# Patient Record
Sex: FEMALE | Race: BLACK OR AFRICAN AMERICAN | Employment: FULL TIME | URBAN - METROPOLITAN AREA
[De-identification: names, ages, dates, MRNs, and addresses within clinical notes are randomized per-mention and may not be internally consistent; named-entity substitution may affect disease eponyms.]

---

## 2017-08-09 ENCOUNTER — OFFICE VISIT (OUTPATIENT)
Dept: INTERNAL MEDICINE CLINIC | Age: 37
End: 2017-08-09

## 2017-08-09 VITALS
HEART RATE: 67 BPM | RESPIRATION RATE: 16 BRPM | HEIGHT: 64 IN | OXYGEN SATURATION: 98 % | SYSTOLIC BLOOD PRESSURE: 113 MMHG | DIASTOLIC BLOOD PRESSURE: 66 MMHG | TEMPERATURE: 98.5 F | BODY MASS INDEX: 27.31 KG/M2 | WEIGHT: 160 LBS

## 2017-08-09 DIAGNOSIS — Z30.09 BIRTH CONTROL COUNSELING: ICD-10-CM

## 2017-08-09 DIAGNOSIS — Z00.00 WELLNESS EXAMINATION: Primary | ICD-10-CM

## 2017-08-09 DIAGNOSIS — J30.9 ALLERGIC RHINITIS, UNSPECIFIED ALLERGIC RHINITIS TRIGGER, UNSPECIFIED RHINITIS SEASONALITY: ICD-10-CM

## 2017-08-09 DIAGNOSIS — N93.8 DUB (DYSFUNCTIONAL UTERINE BLEEDING): ICD-10-CM

## 2017-08-09 LAB
HCG URINE, QL. (POC): NEGATIVE
VALID INTERNAL CONTROL?: YES

## 2017-08-09 RX ORDER — MONTELUKAST SODIUM 10 MG/1
10 TABLET ORAL DAILY
COMMUNITY
End: 2017-08-09 | Stop reason: SDUPTHER

## 2017-08-09 RX ORDER — ACETAMINOPHEN AND CODEINE PHOSPHATE 120; 12 MG/5ML; MG/5ML
0.35 SOLUTION ORAL DAILY
Qty: 84 TAB | Refills: 3 | Status: SHIPPED | OUTPATIENT
Start: 2017-08-09 | End: 2018-08-24 | Stop reason: ALTCHOICE

## 2017-08-09 RX ORDER — MONTELUKAST SODIUM 10 MG/1
10 TABLET ORAL DAILY
Qty: 90 TAB | Refills: 3 | Status: SHIPPED | OUTPATIENT
Start: 2017-08-09 | End: 2018-06-03 | Stop reason: SDUPTHER

## 2017-08-09 RX ORDER — ALBUTEROL SULFATE 90 UG/1
AEROSOL, METERED RESPIRATORY (INHALATION)
COMMUNITY
End: 2020-11-23 | Stop reason: SDUPTHER

## 2017-08-09 NOTE — PROGRESS NOTES
Ronal Ortega is a 29 y.o. female who presents today for Establish Care (Pt here to re-establish care) and Medication Refill  . She has a history of   Patient Active Problem List   Diagnosis Code    Hx of ectopic pregnancy Z87.59    Encounter for repeat Papanicolaou smear of cervix due to previous unsatisfactory results R87.615   . Today patient is here to re-establish. she does not have other concerns. Allergies: taking albuterol PRN. On antihistamine and singulair. Had a baby last year. Health maintenance hx includes:  Exercise: moderately active. Form of exercise: Starting working out. 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  and child, and 2 step children. Family: MGM with colon cancer. Father: healthy. Mother: Healthy, some arthritis. MGF: liver disease    Screening:    Breast cancer screening: last mammogram 2015 and   was normal   Cervical cancer screening: last PAP/Pelvic exam: 2016  and was normal. OBGYN: Midwives. Still nursing. Irregular. On Micronor    Immunizations:     Immunization History   Administered Date(s) Administered    Hepatitis B Vaccine 11/25/2011, 12/27/2011, 05/30/2012    Tdap 08/07/2014, 01/18/2016      Immunization status: up to date and documented. ROS  Review of Systems   Constitutional: Negative for chills, fever and weight loss. HENT: Negative for congestion and sore throat. Eyes: Negative for blurred vision, double vision, photophobia and pain. Respiratory: Negative for cough and shortness of breath. Cardiovascular: Negative for chest pain, palpitations, orthopnea, claudication and leg swelling. Gastrointestinal: Negative for abdominal pain, constipation, diarrhea, heartburn, nausea and vomiting. Genitourinary: Negative for dysuria, frequency and urgency. Musculoskeletal: Negative for joint pain and myalgias. Skin: Negative for rash. Neurological: Negative. Negative for headaches. Endo/Heme/Allergies: Does not bruise/bleed easily. Psychiatric/Behavioral: Negative for depression, memory loss, substance abuse and suicidal ideas. Visit Vitals    /66 (BP 1 Location: Left arm, BP Patient Position: Sitting)    Pulse 67    Temp 98.5 °F (36.9 °C) (Oral)    Resp 16    Ht 5' 4\" (1.626 m)    Wt 160 lb (72.6 kg)    SpO2 98%    Breastfeeding Yes    BMI 27.46 kg/m2       Physical Exam   Constitutional: She is oriented to person, place, and time. She appears well-developed and well-nourished. No distress. HENT:   Head: Normocephalic and atraumatic. Neck: Normal range of motion. Neck supple. No thyromegaly present. Cardiovascular: Normal rate and regular rhythm. No murmur heard. Pulmonary/Chest: Effort normal and breath sounds normal. She has no wheezes. Abdominal: Soft. Bowel sounds are normal. She exhibits no distension. Musculoskeletal: She exhibits no edema. Neurological: She is alert and oriented to person, place, and time. No cranial nerve deficit. Skin: Skin is warm and dry. Psychiatric: She has a normal mood and affect. Her behavior is normal.         Current Outpatient Prescriptions   Medication Sig    albuterol (PROAIR HFA) 90 mcg/actuation inhaler Take  by inhalation.  norethindrone (MICRONOR) 0.35 mg tab Take 1 Tab by mouth daily. Indications: Pregnancy Contraception    montelukast (SINGULAIR) 10 mg tablet Take 1 Tab by mouth daily.  PNV#67-iron ps-FA cmb#1-dha (VITAFOL ULTRA) 29 mg iron- 1 mg-200 mg cap Take 1 Tab by mouth daily.  Cetirizine 10 mg cap Take  by mouth. No current facility-administered medications for this visit.          Past Medical History:   Diagnosis Date    Abnormal Papanicolaou smear of cervix 2012    colpo with biopsy, expectant management last pap 2015 normal    Asthma     Ectopic pregnancy       Past Surgical History:   Procedure Laterality Date    HX GYN      ectopic pregnancy with fallopian tube removed      Social History   Substance Use Topics    Smoking status: Never Smoker    Smokeless tobacco: Never Used    Alcohol use 1.0 oz/week     1 Glasses of wine, 1 Shots of liquor per week      Comment: occasionally      Family History   Problem Relation Age of Onset    Colon Polyps Father     Cancer Maternal Aunt      breast    Cancer Paternal Grandmother      colon CA    Arthritis-osteo Mother     Other Mother      palpitations    Alcohol abuse Paternal Grandfather     Heart Disease Paternal Grandfather     Alcohol abuse Maternal Grandfather     Asthma Neg Hx     Diabetes Neg Hx         Allergies   Allergen Reactions    Latex Rash        Assessment/Plan  Diagnoses and all orders for this visit:    1. Wellness examination - re-establishing care. Healthy. No concerns. Check fasting bloodwork. Odalys Callahan was counseled on age-appropriate/ guideline-based risk prevention behaviors and screening for a 29y.o. year old   female . We also discussed adjustments in screening based on family history if necessary. Printed instructions for preventative screening guidelines and healthy behaviors given to patient with after visit summary.    -     CBC WITH AUTOMATED DIFF  -     METABOLIC PANEL, COMPREHENSIVE  -     LIPID PANEL    2. Birth control counseling  -     AMB POC URINE PREGNANCY TEST, VISUAL COLOR COMPARISON  -     norethindrone (MICRONOR) 0.35 mg tab; Take 1 Tab by mouth daily. Indications: Pregnancy Contraception    3. DUB (dysfunctional uterine bleeding) - No smoking or family history of clots. -     norethindrone (MICRONOR) 0.35 mg tab; Take 1 Tab by mouth daily. Indications: Pregnancy Contraception    4. Allergic rhinitis, unspecified allergic rhinitis trigger, unspecified rhinitis seasonality  -     montelukast (SINGULAIR) 10 mg tablet; Take 1 Tab by mouth daily.         Follow-up Disposition:  Return in about 1 year (around 8/9/2018).     Emma Mcnair MD  8/9/2017

## 2017-08-09 NOTE — PATIENT INSTRUCTIONS

## 2017-08-09 NOTE — MR AVS SNAPSHOT
Visit Information Date & Time Provider Department Dept. Phone Encounter #  
 8/9/2017 10:15 AM Criss Lobato MD [facility] [address] [place] [id number] Follow-up Instructions Return in about 1 year (around 8/9/2018). Upcoming Health Maintenance Date Due INFLUENZA AGE 9 TO ADULT 8/1/2017 PAP AKA CERVICAL CYTOLOGY 11/11/2019 DTaP/Tdap/Td series (2 - Td) 1/18/2026 Allergies as of 8/9/2017  Review Complete On: 8/9/2017 By: Criss Lobato MD  
  
 Severity Noted Reaction Type Reactions Latex  06/26/2015    Rash Current Immunizations  Reviewed on 8/7/2014 Name Date Hepatitis B Vaccine 5/30/2012, 12/27/2011, 11/25/2011 Tdap 1/18/2016, 8/7/2014 Not reviewed this visit You Were Diagnosed With   
  
 Codes Comments Wellness examination    -  Primary ICD-10-CM: Z00.00 ICD-9-CM: V70.0 Birth control counseling     ICD-10-CM: Z30.09 
ICD-9-CM: V25.09   
 DUB (dysfunctional uterine bleeding)     ICD-10-CM: N93.8 ICD-9-CM: 626.8 Allergic rhinitis, unspecified allergic rhinitis trigger, unspecified rhinitis seasonality     ICD-10-CM: J30.9 ICD-9-CM: 477.9 Vitals BP Pulse Temp Resp Height(growth percentile) Weight(growth percentile) 113/66 (BP 1 Location: Left arm, BP Patient Position: Sitting) 67 98.5 °F (36.9 °C) (Oral) 16 5' 4\" (1.626 m) 160 lb (72.6 kg) SpO2 Breastfeeding? BMI OB Status Smoking Status 98% Yes 27.46 kg/m2 Breastfeeding Never Smoker Vitals History BMI and BSA Data Body Mass Index Body Surface Area  
 27.46 kg/m 2 1.81 m 2 Preferred Pharmacy Pharmacy Name Phone [phone] [phone] Your Updated Medication List  
  
   
This list is accurate as of: 8/9/17 11:07 AM.  Always use your most recent med list.  
  
  
  
  
 Cetirizine 10 mg Cap Take  by mouth. montelukast 10 mg tablet Commonly known as:  SINGULAIR Take 1 Tab by mouth daily. norethindrone 0.35 mg Tab Commonly known as:  Jordan [employer] Jordan Take 1 Tab by mouth daily. Indications: Pregnancy Contraception PNV 67-iron ps-folate no. 1-dha 29 mg iron- 1 mg-200 mg Cap Commonly known as:  [place] Ame Take 1 Tab by mouth daily. PROAIR HFA 90 mcg/actuation inhaler Generic drug:  albuterol Take  by inhalation. Prescriptions Sent to Pharmacy Refills  
 norethindrone (MICRONOR) 0.35 mg tab 3 Sig: Take 1 Tab by mouth daily. Indications: Pregnancy Contraception Class: Normal  
 Pharmacy: [facility] [place] [address] Ph #: [phone] Route: Oral  
 montelukast (SINGULAIR) 10 mg tablet 3 Sig: Take 1 Tab by mouth daily. Class: Normal  
 Pharmacy: [facility]/pharmacy #[address] [place] [address] Ph #: [phone] Route: Oral  
  
We Performed the Following AMB POC URINE PREGNANCY TEST, VISUAL COLOR COMPARISON [84042 CPT(R)] CBC WITH AUTOMATED DIFF [62844 CPT(R)] LIPID PANEL [25922 CPT(R)] METABOLIC PANEL, COMPREHENSIVE [66352 CPT(R)] Follow-up Instructions Return in about 1 year (around 8/9/2018). Patient Instructions Well Visit, Ages 25 to 48: Care Instructions Your Care Instructions Physical exams can help you stay healthy. Your doctor has checked your overall health and may have suggested ways to take good care of yourself. He or she also may have recommended tests. At home, you can help prevent illness with healthy eating, regular exercise, and other steps. Follow-up care is a key part of your treatment and safety. Be sure to make and go to all appointments, and call your doctor if you are having problems. It's also a good idea to know your test results and keep a list of the medicines you take. How can you care for yourself at home? · Reach and stay at a healthy weight. This will lower your risk for many problems, such as obesity, diabetes, heart disease, and high blood pressure. · Get at least 30 minutes of physical activity on most days of the week. Walking is a good choice. You also may want to do other activities, such as running, swimming, cycling, or playing tennis or team sports. Discuss any changes in your exercise program with your doctor. · Do not smoke or allow others to smoke around you. If you need help quitting, talk to your doctor about stop-smoking programs and medicines. These can increase your chances of quitting for good. · Talk to your doctor about whether you have any risk factors for sexually transmitted infections (STIs). Having one sex partner (who does not have STIs and does not have sex with anyone else) is a good way to avoid these infections. · Use birth control if you do not want to have children at this time. Talk with your doctor about the choices available and what might be best for you. · Protect your skin from too much sun. When you're outdoors from 10 a.m. to 4 p.m., stay in the shade or cover up with clothing and a hat with a wide brim. Wear sunglasses that block UV rays. Even when it's cloudy, put broad-spectrum sunscreen (SPF 30 or higher) on any exposed skin. · See a dentist one or two times a year for checkups and to have your teeth cleaned. · Wear a seat belt in the car. · Drink alcohol in moderation, if at all. That means no more than 2 drinks a day for men and 1 drink a day for women. Follow your doctor's advice about when to have certain tests. These tests can spot problems early. For everyone · Cholesterol. Have the fat (cholesterol) in your blood tested after age 21. Your doctor will tell you how often to have this done based on your age, family history, or other things that can increase your risk for heart disease. · Blood pressure. Have your blood pressure checked during a routine doctor visit. Your doctor will tell you how often to check your blood pressure based on your age, your blood pressure results, and other factors. · Vision. Talk with your doctor about how often to have a glaucoma test. 
· Diabetes. Ask your doctor whether you should have tests for diabetes. · Colon cancer. Have a test for colon cancer at age 48. You may have one of several tests. If you are younger than 48, you may need a test earlier if you have any risk factors. Risk factors include whether you already had a precancerous polyp removed from your colon or whether your parent, brother, sister, or child has had colon cancer. For women · Breast exam and mammogram. Talk to your doctor about when you should have a clinical breast exam and a mammogram. Medical experts differ on whether and how often women under 50 should have these tests. Your doctor can help you decide what is right for you. · Pap test and pelvic exam. Begin Pap tests at age 24. A Pap test is the best way to find cervical cancer. The test often is part of a pelvic exam. Ask how often to have this test. 
· Tests for sexually transmitted infections (STIs). Ask whether you should have tests for STIs. You may be at risk if you have sex with more than one person, especially if your partners do not wear condoms. For men · Tests for sexually transmitted infections (STIs). Ask whether you should have tests for STIs. You may be at risk if you have sex with more than one person, especially if you do not wear a condom. · Testicular cancer exam. Ask your doctor whether you should check your testicles regularly. · Prostate exam. Talk to your doctor about whether you should have a blood test (called a PSA test) for prostate cancer.  Experts differ on whether and when men should have this test. Some experts suggest it if you are older than 39 and are -American or have a father or brother who got prostate cancer when he was younger than 72. When should you call for help? Watch closely for changes in your health, and be sure to contact your doctor if you have any problems or symptoms that concern you. Where can you learn more? Go to http://kody-kimberlee.info/. Enter P072 in the search box to learn more about \"Well Visit, Ages 25 to 48: Care Instructions. \" Current as of: July 19, 2016 Content Version: 11.3 © 3955-5553 [email or web address]. Care instructions adapted under license by [employer] (which disclaims liability or warranty for this information). If you have questions about a medical condition or this instruction, always ask your healthcare professional. [address] any warranty or liability for your use of this information. Introducing [facility]! Dear Vesta Rome: Thank you for requesting a [employer] account. Our records indicate that you already have an active [employer] account. You can access your account anytime at [email or web address] Did you know that you can access your hospital and ER discharge instructions at any time in [employer]? You can also review all of your test results from your hospital stay or ER visit. Additional Information If you have questions, please visit the Frequently Asked Questions section of the [employer] website at [email or web address] Remember, [employer] is NOT to be used for urgent needs. For medical emergencies, dial 911. Now available from your iPhone and Android! Please provide this summary of care documentation to your next provider. Your primary care clinician is listed as Talib Simms. If you have any questions after today's visit, please call [phone].

## 2017-08-10 LAB
ALBUMIN SERPL-MCNC: 4.3 G/DL (ref 3.5–5.5)
ALBUMIN/GLOB SERPL: 1.4 {RATIO} (ref 1.2–2.2)
ALP SERPL-CCNC: 103 IU/L (ref 39–117)
ALT SERPL-CCNC: 8 IU/L (ref 0–32)
AST SERPL-CCNC: 15 IU/L (ref 0–40)
BASOPHILS # BLD AUTO: 0 X10E3/UL (ref 0–0.2)
BASOPHILS NFR BLD AUTO: 0 %
BILIRUB SERPL-MCNC: 0.7 MG/DL (ref 0–1.2)
BUN SERPL-MCNC: 11 MG/DL (ref 6–20)
BUN/CREAT SERPL: 17 (ref 9–23)
CALCIUM SERPL-MCNC: 9.4 MG/DL (ref 8.7–10.2)
CHLORIDE SERPL-SCNC: 100 MMOL/L (ref 96–106)
CHOLEST SERPL-MCNC: 164 MG/DL (ref 100–199)
CO2 SERPL-SCNC: 25 MMOL/L (ref 18–29)
CREAT SERPL-MCNC: 0.66 MG/DL (ref 0.57–1)
EOSINOPHIL # BLD AUTO: 0.2 X10E3/UL (ref 0–0.4)
EOSINOPHIL NFR BLD AUTO: 4 %
ERYTHROCYTE [DISTWIDTH] IN BLOOD BY AUTOMATED COUNT: 12.9 % (ref 12.3–15.4)
GLOBULIN SER CALC-MCNC: 3.1 G/DL (ref 1.5–4.5)
GLUCOSE SERPL-MCNC: 81 MG/DL (ref 65–99)
HCT VFR BLD AUTO: 38.4 % (ref 34–46.6)
HDLC SERPL-MCNC: 57 MG/DL
HGB BLD-MCNC: 13 G/DL (ref 11.1–15.9)
IMM GRANULOCYTES # BLD: 0 X10E3/UL (ref 0–0.1)
IMM GRANULOCYTES NFR BLD: 0 %
INTERPRETATION, 910389: NORMAL
LDLC SERPL CALC-MCNC: 99 MG/DL (ref 0–99)
LYMPHOCYTES # BLD AUTO: 2.2 X10E3/UL (ref 0.7–3.1)
LYMPHOCYTES NFR BLD AUTO: 35 %
MCH RBC QN AUTO: 29.7 PG (ref 26.6–33)
MCHC RBC AUTO-ENTMCNC: 33.9 G/DL (ref 31.5–35.7)
MCV RBC AUTO: 88 FL (ref 79–97)
MONOCYTES # BLD AUTO: 0.3 X10E3/UL (ref 0.1–0.9)
MONOCYTES NFR BLD AUTO: 5 %
NEUTROPHILS # BLD AUTO: 3.5 X10E3/UL (ref 1.4–7)
NEUTROPHILS NFR BLD AUTO: 56 %
PLATELET # BLD AUTO: 315 X10E3/UL (ref 150–379)
POTASSIUM SERPL-SCNC: 4.7 MMOL/L (ref 3.5–5.2)
PROT SERPL-MCNC: 7.4 G/DL (ref 6–8.5)
RBC # BLD AUTO: 4.38 X10E6/UL (ref 3.77–5.28)
SODIUM SERPL-SCNC: 139 MMOL/L (ref 134–144)
TRIGL SERPL-MCNC: 41 MG/DL (ref 0–149)
VLDLC SERPL CALC-MCNC: 8 MG/DL (ref 5–40)
WBC # BLD AUTO: 6.2 X10E3/UL (ref 3.4–10.8)

## 2018-06-03 DIAGNOSIS — J30.9 ALLERGIC RHINITIS: ICD-10-CM

## 2018-06-04 RX ORDER — MONTELUKAST SODIUM 10 MG/1
TABLET ORAL
Qty: 90 TAB | Refills: 3 | Status: SHIPPED | OUTPATIENT
Start: 2018-06-04 | End: 2018-08-24 | Stop reason: SDUPTHER

## 2018-07-10 ENCOUNTER — OFFICE VISIT (OUTPATIENT)
Dept: MIDWIFE SERVICES | Age: 38
End: 2018-07-10

## 2018-07-10 VITALS
BODY MASS INDEX: 27.83 KG/M2 | HEIGHT: 64 IN | SYSTOLIC BLOOD PRESSURE: 117 MMHG | HEART RATE: 70 BPM | DIASTOLIC BLOOD PRESSURE: 62 MMHG | WEIGHT: 163 LBS

## 2018-07-10 DIAGNOSIS — N92.6 IRREGULAR MENSES: Primary | ICD-10-CM

## 2018-07-10 DIAGNOSIS — Z01.419 WELL WOMAN EXAM WITH ROUTINE GYNECOLOGICAL EXAM: ICD-10-CM

## 2018-07-10 RX ORDER — NORETHINDRONE ACETATE AND ETHINYL ESTRADIOL 1; .02 MG/1; MG/1
1 TABLET ORAL DAILY
Qty: 3 PACKAGE | Refills: 3 | Status: SHIPPED | OUTPATIENT
Start: 2018-07-10 | End: 2019-06-26 | Stop reason: SDUPTHER

## 2018-07-10 NOTE — PROGRESS NOTES
Chief Complaint   Patient presents with    Well Woman     Visit Vitals    /62    Pulse 70    Ht 5' 4\" (1.626 m)    Wt 163 lb (73.9 kg)    LMP 07/01/2018    Breastfeeding Yes    BMI 27.98 kg/m2     1. Have you been to the ER, urgent care clinic since your last visit? Hospitalized since your last visit? No    2. Have you seen or consulted any other health care providers outside of the [address] [place] [address] since your last visit? Include any pap smears or colon screening. No    Here today for well woman exam.  She would like to have a pap smear today for history of abnormal paps as well as periods being irregular recently. Reports negative preg test at home.     Verbal order for loestrin birth control pills sent to her pharmacy on record per peyton hollye cnm

## 2018-07-10 NOTE — PROGRESS NOTES
Annual exam ages 21-44    Octavia Mckoy is a ,  40 y.o. female [address] Patient's last menstrual period was 2018. .    She presents for her annual checkup. She is having irregular periods on the micronor. Menstrual status:    Her periods are every 20-33 days and moderate. She experiences mild dysmenorrhea. She reports no premenstrual symptoms. Contraception:    The current method of family planning is oral progesterone-only contraceptive. Sexual history:     She  reports that she currently engages in sexual activity and has had male partners. She reports using the following method of birth control/protection: None. .    Medical conditions:    Since her last annual GYN exam about two years ago, she has not the following changes in her health history: none. Pap and Mammogram History:    Her most recent Pap smear was normal, obtained 2 year(s) ago. The patient has never had a mammogram.    Breast Cancer History/Substance Abuse: negative    Past Medical History:   Diagnosis Date    Abnormal Papanicolaou smear of cervix     colpo with biopsy, expectant management last pap  normal    Asthma     Ectopic pregnancy      Past Surgical History:   Procedure Laterality Date    HX GYN      ectopic pregnancy with fallopian tube removed       Current Outpatient Prescriptions   Medication Sig Dispense Refill    MULTIVITAMIN PO Take  by mouth.  norethindrone-ethinyl estradiol (LOESTRIN , ,) 1-20 mg-mcg tab Take 1 Tab by mouth daily. 3 Package 3    montelukast (SINGULAIR) 10 mg tablet TAKE 1 TABLET BY MOUTH EVERY DAY 90 Tab 3    norethindrone (MICRONOR) 0.35 mg tab Take 1 Tab by mouth daily. Indications: Pregnancy Contraception 84 Tab 3    Cetirizine 10 mg cap Take  by mouth.  albuterol (PROAIR HFA) 90 mcg/actuation inhaler Take  by inhalation.       PNV#67-iron ps-FA cmb#1-dha (VITAFOL ULTRA) 29 mg iron- 1 mg-200 mg cap Take 1 Tab by mouth daily. 30 Tab 11     Allergies: Latex     Tobacco History:  reports that she has never smoked. She has never used smokeless tobacco.  Alcohol Abuse:  reports that she drinks about 1.0 oz of alcohol per week   Drug Abuse:  reports that she does not use illicit drugs.     Family Medical/Cancer History:   Family History   Problem Relation Age of Onset    Colon Polyps Father     Cancer Maternal Aunt      breast    Cancer Paternal Grandmother      colon CA    Arthritis-osteo Mother     Other Mother      palpitations    Alcohol abuse Paternal Grandfather     Heart Disease Paternal Grandfather     Alcohol abuse Maternal Grandfather     Asthma Neg Hx     Diabetes Neg Hx         Review of Systems - History obtained from the patient  Constitutional: negative for weight loss, fever, night sweats  HEENT: negative for hearing loss, earache, congestion, snoring, sorethroat  CV: negative for chest pain, palpitations, edema  Resp: negative for cough, shortness of breath, wheezing  GI: negative for change in bowel habits, abdominal pain, black or bloody stools  : negative for frequency, dysuria, hematuria, vaginal discharge  MSK: negative for back pain, joint pain, muscle pain  Breast: negative for breast lumps, nipple discharge, galactorrhea  Skin :negative for itching, rash, hives, several lesions noted for which she has seen a dermatologist.    Neuro: negative for dizziness, headache, confusion, weakness  Psych: negative for anxiety, depression, change in mood  Heme/lymph: negative for bleeding, bruising, pallor    Physical Exam    Visit Vitals    /62    Pulse 70    Ht 5' 4\" (1.626 m)    Wt 163 lb (73.9 kg)    LMP 07/01/2018    Breastfeeding Yes    BMI 27.98 kg/m2       Constitutional  · Appearance: well-nourished, well developed, alert, in no acute distress    HENT  · Head and Face: appears normal    Neck  · Inspection/Palpation: normal appearance, no masses or tenderness  · Lymph Nodes: no lymphadenopathy present  · Thyroid: gland size normal, nontender, no nodules or masses present on palpation    Chest  · Respiratory Effort: breathing unlabored  · Auscultation: normal breath sounds    Cardiovascular  · Heart:  · Auscultation: regular rate and rhythm without murmur    Breasts  · Inspection of Breasts: breasts symmetrical, no skin changes, no discharge present, nipple appearance normal, no skin retraction present  · Palpation of Breasts and Axillae: no masses present on palpation, no breast tenderness  · Axillary Lymph Nodes: no lymphadenopathy present    Gastrointestinal  · Abdominal Examination: abdomen non-tender to palpation, normal bowel sounds, no masses present  · Liver and spleen: no hepatomegaly present, spleen not palpable  · Hernias: no hernias identified    Genitourinary  · External Genitalia: normal appearance for age, no discharge present, no tenderness present, no inflammatory lesions present, no masses present, no atrophy present  · Vagina: normal vaginal vault without central or paravaginal defects, no discharge present, no inflammatory lesions present, no masses present  · Bladder: non-tender to palpation  · Urethra: appears normal  · Cervix: normal   · Uterus: normal size, shape and consistency  · Adnexa: no adnexal tenderness present, no adnexal masses present  · Perineum: perineum within normal limits, no evidence of trauma, no rashes or skin lesions present  · Anus: anus within normal limits, no hemorrhoids present  · Inguinal Lymph Nodes: no lymphadenopathy present    Skin  · General Inspection: no rash, no lesions identified    Neurologic/Psychiatric  · Mental Status:  · Orientation: grossly oriented to person, place and time  · Mood and Affect: mood normal, affect appropriate    . Assessment:  Routine gynecologic examination  Her current medical status is satisfactory with no evidence of significant gynecologic issues.     Plan:  Counseled re: diet, exercise, healthy lifestyle  Return for yearly wellness visits  Rx for loestrin Fe and pt will switch today.     Gardisil counseling provided  Pt counseled regarding co-testing for high risk HPV with pap  Rec screening mammo at either 36 or 41

## 2018-07-10 NOTE — PATIENT INSTRUCTIONS

## 2018-08-24 ENCOUNTER — OFFICE VISIT (OUTPATIENT)
Dept: INTERNAL MEDICINE CLINIC | Age: 38
End: 2018-08-24

## 2018-08-24 VITALS
HEART RATE: 67 BPM | SYSTOLIC BLOOD PRESSURE: 99 MMHG | DIASTOLIC BLOOD PRESSURE: 63 MMHG | OXYGEN SATURATION: 98 % | WEIGHT: 161 LBS | BODY MASS INDEX: 27.49 KG/M2 | RESPIRATION RATE: 16 BRPM | TEMPERATURE: 98.5 F | HEIGHT: 64 IN

## 2018-08-24 DIAGNOSIS — J30.9 ALLERGIC RHINITIS, UNSPECIFIED SEASONALITY, UNSPECIFIED TRIGGER: ICD-10-CM

## 2018-08-24 DIAGNOSIS — Z00.00 WELLNESS EXAMINATION: Primary | ICD-10-CM

## 2018-08-24 RX ORDER — MONTELUKAST SODIUM 10 MG/1
TABLET ORAL
Qty: 90 TAB | Refills: 3 | Status: SHIPPED | OUTPATIENT
Start: 2018-08-24 | End: 2019-11-05 | Stop reason: SDUPTHER

## 2018-08-24 NOTE — MR AVS SNAPSHOT
[facility] 
 
 
 [address] [place], [place] [address] [facility] 
[phone] Patient: Prasanth Steinberg MRN: [id number] JOJ:[birth year] Visit Information Date & Time Provider Department Dept. Phone Encounter #  
 8/24/2018  9:30 AM Ted Barger MD [facility] [address] [facility] [id number] Upcoming Health Maintenance Date Due Influenza Age 5 to Adult 8/1/2018 PAP AKA CERVICAL CYTOLOGY 11/11/2019 DTaP/Tdap/Td series (2 - Td) 1/18/2026 Allergies as of 8/24/2018  Review Complete On: 4/56/8372 By: Raquel Roque Severity Noted Reaction Type Reactions Latex  06/26/2015    Rash Current Immunizations  Reviewed on 8/7/2014 Name Date Hepatitis B Vaccine 5/30/2012, 12/27/2011, 11/25/2011 Tdap 1/18/2016, 8/7/2014 Not reviewed this visit You Were Diagnosed With   
  
 Codes Comments Wellness examination    -  Primary ICD-10-CM: Z00.00 ICD-9-CM: V70.0 Allergic rhinitis, unspecified seasonality, unspecified trigger     ICD-10-CM: J30.9 ICD-9-CM: 477.9 Vitals BP Pulse Temp Resp Height(growth percentile) Weight(growth percentile) 99/63 (BP 1 Location: Left arm, BP Patient Position: Sitting) 67 98.5 °F (36.9 °C) (Oral) 16 5' 4\" (1.626 m) 161 lb (73 kg) LMP SpO2 Breastfeeding? BMI OB Status Smoking Status 08/08/2018 98% Yes 27.64 kg/m2 Having regular periods Never Smoker Vitals History BMI and BSA Data Body Mass Index Body Surface Area  
 27.64 kg/m 2 1.82 m 2 Preferred Pharmacy Pharmacy Name Phone [phone] [phone] Your Updated Medication List  
  
   
This list is accurate as of 8/24/18 10:36 AM.  Always use your most recent med list.  
  
  
  
  
 Cetirizine 10 mg Cap Take  by mouth.  
  
 montelukast 10 mg tablet Commonly known as:  SINGULAIR  
 TAKE 1 TABLET BY MOUTH EVERY DAY  
  
 MULTIVITAMIN PO Take  by mouth. norethindrone-ethinyl estradiol 1-20 mg-mcg Tab Commonly known as:  LOESTRIN 1/20 (21) Take 1 Tab by mouth daily. PROAIR HFA 90 mcg/actuation inhaler Generic drug:  albuterol Take  by inhalation. We Performed the Following CBC WITH AUTOMATED DIFF [33693 CPT(R)] LIPID PANEL [36815 CPT(R)] METABOLIC PANEL, COMPREHENSIVE [40519 CPT(R)] Patient Instructions Well Visit, Ages 25 to 48: Care Instructions Your Care Instructions Physical exams can help you stay healthy. Your doctor has checked your overall health and may have suggested ways to take good care of yourself. He or she also may have recommended tests. At home, you can help prevent illness with healthy eating, regular exercise, and other steps. Follow-up care is a key part of your treatment and safety. Be sure to make and go to all appointments, and call your doctor if you are having problems. It's also a good idea to know your test results and keep a list of the medicines you take. How can you care for yourself at home? · Reach and stay at a healthy weight. This will lower your risk for many problems, such as obesity, diabetes, heart disease, and high blood pressure. · Get at least 30 minutes of physical activity on most days of the week. Walking is a good choice. You also may want to do other activities, such as running, swimming, cycling, or playing tennis or team sports. Discuss any changes in your exercise program with your doctor. · Do not smoke or allow others to smoke around you. If you need help quitting, talk to your doctor about stop-smoking programs and medicines. These can increase your chances of quitting for good. · Talk to your doctor about whether you have any risk factors for sexually transmitted infections (STIs).  Having one sex partner (who does not have STIs and does not have sex with anyone else) is a good way to avoid these infections. · Use birth control if you do not want to have children at this time. Talk with your doctor about the choices available and what might be best for you. · Protect your skin from too much sun. When you're outdoors from 10 a.m. to 4 p.m., stay in the shade or cover up with clothing and a hat with a wide brim. Wear sunglasses that block UV rays. Even when it's cloudy, put broad-spectrum sunscreen (SPF 30 or higher) on any exposed skin. · See a dentist one or two times a year for checkups and to have your teeth cleaned. · Wear a seat belt in the car. · Drink alcohol in moderation, if at all. That means no more than 2 drinks a day for men and 1 drink a day for women. Follow your doctor's advice about when to have certain tests. These tests can spot problems early. For everyone · Cholesterol. Have the fat (cholesterol) in your blood tested after age 21. Your doctor will tell you how often to have this done based on your age, family history, or other things that can increase your risk for heart disease. · Blood pressure. Have your blood pressure checked during a routine doctor visit. Your doctor will tell you how often to check your blood pressure based on your age, your blood pressure results, and other factors. · Vision. Talk with your doctor about how often to have a glaucoma test. 
· Diabetes. Ask your doctor whether you should have tests for diabetes. · Colon cancer. Have a test for colon cancer at age 48. You may have one of several tests. If you are younger than 48, you may need a test earlier if you have any risk factors. Risk factors include whether you already had a precancerous polyp removed from your colon or whether your parent, brother, sister, or child has had colon cancer. For women · Breast exam and mammogram. Talk to your doctor about when you should have a clinical breast exam and a mammogram. Medical experts differ on whether and how often women under 50 should have these tests. Your doctor can help you decide what is right for you. · Pap test and pelvic exam. Begin Pap tests at age 24. A Pap test is the best way to find cervical cancer. The test often is part of a pelvic exam. Ask how often to have this test. 
· Tests for sexually transmitted infections (STIs). Ask whether you should have tests for STIs. You may be at risk if you have sex with more than one person, especially if your partners do not wear condoms. For men · Tests for sexually transmitted infections (STIs). Ask whether you should have tests for STIs. You may be at risk if you have sex with more than one person, especially if you do not wear a condom. · Testicular cancer exam. Ask your doctor whether you should check your testicles regularly. · Prostate exam. Talk to your doctor about whether you should have a blood test (called a PSA test) for prostate cancer. Experts differ on whether and when men should have this test. Some experts suggest it if you are older than 39 and are -American or have a father or brother who got prostate cancer when he was younger than 72. When should you call for help? Watch closely for changes in your health, and be sure to contact your doctor if you have any problems or symptoms that concern you. Where can you learn more? Go to http://kody-kimberlee.info/. Enter P072 in the search box to learn more about \"Well Visit, Ages 25 to 48: Care Instructions. \" Current as of: May 16, 2017 Content Version: 11.7 © 5570-2221 Healthwise, Incorporated. Care instructions adapted under license by [employer] (which disclaims liability or warranty for this information).  If you have questions about a medical condition or this instruction, always ask your healthcare professional. [employer] disclaims any warranty or liability for your use of this information. Introducing [facility]! Dear Cliff Gillette: Thank you for requesting a Morphy account. Our records indicate that you already have an active Morphy account. You can access your account anytime at [email or web address] Did you know that you can access your hospital and ER discharge instructions at any time in Morphy? You can also review all of your test results from your hospital stay or ER visit. Additional Information If you have questions, please visit the Frequently Asked Questions section of the Morphy website at [email or web address] Remember, Morphy is NOT to be used for urgent needs. For medical emergencies, dial 911. Now available from your iPhone and Android! Please provide this summary of care documentation to your next provider. Your primary care clinician is listed as Rosa Austin. If you have any questions after today's visit, please call [phone].

## 2018-08-24 NOTE — PROGRESS NOTES
Elgin Torres is a 40 y.o. female who presents today for Complete Physical    She has a history of   Patient Active Problem List   Diagnosis Code    Hx of ectopic pregnancy Z87.59    Encounter for repeat Papanicolaou smear of cervix due to previous unsatisfactory results R87.615   . Today patient is here for CPE. she does not have other concerns. Allergies stable. Continue singulair. Health maintenance hx includes:  Exercise: moderately active. Form of exercise: Starting working out. 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 In [employer] [facility] [employer].  and child, and 2 step children. Cancer screening: UTD. PAP in 2016. Family: Reviewed    Immunizations:     Immunization History   Administered Date(s) Administered    Hepatitis B Vaccine 11/25/2011, 12/27/2011, 05/30/2012    Tdap 08/07/2014, 01/18/2016      Immunization status: up to date and documented. ROS  Review of Systems   Constitutional: Negative for chills, fever and weight loss. Respiratory: Negative for cough and shortness of breath. Cardiovascular: Negative for chest pain, palpitations and leg swelling. Gastrointestinal: Negative for abdominal pain, nausea and vomiting. Neurological: Negative. Endo/Heme/Allergies: Does not bruise/bleed easily. Psychiatric/Behavioral: Negative for depression. The patient is not nervous/anxious. Visit Vitals    BP 99/63 (BP 1 Location: Left arm, BP Patient Position: Sitting)    Pulse 67    Temp 98.5 °F (36.9 °C) (Oral)    Resp 16    Ht 5' 4\" (1.626 m)    Wt 161 lb (73 kg)    SpO2 98%    Breastfeeding Yes    BMI 27.64 kg/m2       Physical Exam   Constitutional: She is oriented to person, place, and time. She appears well-developed and well-nourished. No distress. HENT:   Head: Normocephalic and atraumatic. Neck: Normal range of motion. Neck supple.  No thyromegaly present. Cardiovascular: Normal rate and regular rhythm. No murmur heard. Pulmonary/Chest: Effort normal and breath sounds normal. She has no wheezes. Abdominal: Soft. Bowel sounds are normal. She exhibits no distension. Musculoskeletal: She exhibits no edema. Neurological: She is alert and oriented to person, place, and time. No cranial nerve deficit. Skin: Skin is warm and dry. Psychiatric: She has a normal mood and affect. Her behavior is normal.       Current Outpatient Prescriptions   Medication Sig    MULTIVITAMIN PO Take  by mouth.  norethindrone-ethinyl estradiol (LOESTRIN 1/20, 21,) 1-20 mg-mcg tab Take 1 Tab by mouth daily.  montelukast (SINGULAIR) 10 mg tablet TAKE 1 TABLET BY MOUTH EVERY DAY    albuterol (PROAIR HFA) 90 mcg/actuation inhaler Take  by inhalation.  Cetirizine 10 mg cap Take  by mouth.  norethindrone (MICRONOR) 0.35 mg tab Take 1 Tab by mouth daily. Indications: Pregnancy Contraception    PNV#67-iron ps-FA cmb#1-dha (VITAFOL ULTRA) 29 mg iron- 1 mg-200 mg cap Take 1 Tab by mouth daily. No current facility-administered medications for this visit.          Past Medical History:   Diagnosis Date    Abnormal Papanicolaou smear of cervix 2012    colpo with biopsy, expectant management last pap 2015 normal    Asthma     Ectopic pregnancy       Past Surgical History:   Procedure Laterality Date    HX GYN      ectopic pregnancy with fallopian tube removed      Social History   Substance Use Topics    Smoking status: Never Smoker    Smokeless tobacco: Never Used    Alcohol use 1.0 oz/week     1 Glasses of wine, 1 Shots of liquor per week      Comment: occasionally      Family History   Problem Relation Age of Onset    Colon Polyps Father     Cancer Maternal Aunt      breast    Cancer Paternal Grandmother      colon CA    Arthritis-osteo Mother     Other Mother      palpitations    Alcohol abuse Paternal Grandfather     Heart Disease Paternal Grandfather     Alcohol abuse Maternal Grandfather     Asthma Neg Hx     Diabetes Neg Hx         Allergies   Allergen Reactions    Latex Rash        Assessment/Plan  Diagnoses and all orders for this visit:    1. Wellness examination - GYN Care done elsewhere. Otherwise healthy. Nicanor Villalpando was counseled on age-appropriate/ guideline-based risk prevention behaviors and screening for a 40y.o. year old   female . We also discussed adjustments in screening based on family history if necessary. Printed instructions for preventative screening guidelines and healthy behaviors given to patient with after visit summary.    -     METABOLIC PANEL, COMPREHENSIVE  -     CBC WITH AUTOMATED DIFF  -     LIPID PANEL    2.  Allergic rhinitis, unspecified seasonality, unspecified trigger  -     montelukast (SINGULAIR) 10 mg tablet; TAKE 1 TABLET BY MOUTH EVERY DAY        Follow-up Disposition: Not on File    Christiano Corey MD  8/24/2018

## 2018-08-24 NOTE — PATIENT INSTRUCTIONS

## 2018-08-25 LAB
ALBUMIN SERPL-MCNC: 4.5 G/DL (ref 3.5–5.5)
ALBUMIN/GLOB SERPL: 1.5 {RATIO} (ref 1.2–2.2)
ALP SERPL-CCNC: 72 IU/L (ref 39–117)
ALT SERPL-CCNC: 11 IU/L (ref 0–32)
AST SERPL-CCNC: 14 IU/L (ref 0–40)
BASOPHILS # BLD AUTO: 0 X10E3/UL (ref 0–0.2)
BASOPHILS NFR BLD AUTO: 1 %
BILIRUB SERPL-MCNC: 0.6 MG/DL (ref 0–1.2)
BUN SERPL-MCNC: 10 MG/DL (ref 6–20)
BUN/CREAT SERPL: 13 (ref 9–23)
CALCIUM SERPL-MCNC: 9.6 MG/DL (ref 8.7–10.2)
CHLORIDE SERPL-SCNC: 101 MMOL/L (ref 96–106)
CHOLEST SERPL-MCNC: 152 MG/DL (ref 100–199)
CO2 SERPL-SCNC: 24 MMOL/L (ref 20–29)
CREAT SERPL-MCNC: 0.75 MG/DL (ref 0.57–1)
EOSINOPHIL # BLD AUTO: 0.2 X10E3/UL (ref 0–0.4)
EOSINOPHIL NFR BLD AUTO: 3 %
ERYTHROCYTE [DISTWIDTH] IN BLOOD BY AUTOMATED COUNT: 12.7 % (ref 12.3–15.4)
GLOBULIN SER CALC-MCNC: 3 G/DL (ref 1.5–4.5)
GLUCOSE SERPL-MCNC: 82 MG/DL (ref 65–99)
HCT VFR BLD AUTO: 40.9 % (ref 34–46.6)
HDLC SERPL-MCNC: 49 MG/DL
HGB BLD-MCNC: 13.6 G/DL (ref 11.1–15.9)
IMM GRANULOCYTES # BLD: 0 X10E3/UL (ref 0–0.1)
IMM GRANULOCYTES NFR BLD: 0 %
INTERPRETATION, 910389: NORMAL
LDLC SERPL CALC-MCNC: 94 MG/DL (ref 0–99)
LYMPHOCYTES # BLD AUTO: 2.8 X10E3/UL (ref 0.7–3.1)
LYMPHOCYTES NFR BLD AUTO: 45 %
MCH RBC QN AUTO: 29.4 PG (ref 26.6–33)
MCHC RBC AUTO-ENTMCNC: 33.3 G/DL (ref 31.5–35.7)
MCV RBC AUTO: 88 FL (ref 79–97)
MONOCYTES # BLD AUTO: 0.3 X10E3/UL (ref 0.1–0.9)
MONOCYTES NFR BLD AUTO: 5 %
NEUTROPHILS # BLD AUTO: 2.9 X10E3/UL (ref 1.4–7)
NEUTROPHILS NFR BLD AUTO: 46 %
PLATELET # BLD AUTO: 320 X10E3/UL (ref 150–379)
POTASSIUM SERPL-SCNC: 4.4 MMOL/L (ref 3.5–5.2)
PROT SERPL-MCNC: 7.5 G/DL (ref 6–8.5)
RBC # BLD AUTO: 4.63 X10E6/UL (ref 3.77–5.28)
SODIUM SERPL-SCNC: 138 MMOL/L (ref 134–144)
TRIGL SERPL-MCNC: 46 MG/DL (ref 0–149)
VLDLC SERPL CALC-MCNC: 9 MG/DL (ref 5–40)
WBC # BLD AUTO: 6.2 X10E3/UL (ref 3.4–10.8)

## 2018-11-28 ENCOUNTER — APPOINTMENT (OUTPATIENT)
Dept: CT IMAGING | Age: 38
End: 2018-11-28
Attending: EMERGENCY MEDICINE
Payer: COMMERCIAL

## 2018-11-28 ENCOUNTER — TELEPHONE (OUTPATIENT)
Dept: INTERNAL MEDICINE CLINIC | Age: 38
End: 2018-11-28

## 2018-11-28 ENCOUNTER — HOSPITAL ENCOUNTER (EMERGENCY)
Age: 38
Discharge: HOME | End: 2018-11-28
Attending: EMERGENCY MEDICINE
Payer: COMMERCIAL

## 2018-11-28 VITALS
DIASTOLIC BLOOD PRESSURE: 78 MMHG | SYSTOLIC BLOOD PRESSURE: 120 MMHG | TEMPERATURE: 98.4 F | OXYGEN SATURATION: 100 % | WEIGHT: 160 LBS | HEART RATE: 66 BPM | HEIGHT: 65 IN | BODY MASS INDEX: 26.66 KG/M2 | RESPIRATION RATE: 16 BRPM

## 2018-11-28 DIAGNOSIS — R07.89 OTHER CHEST PAIN: Primary | ICD-10-CM

## 2018-11-28 LAB
ALBUMIN SERPL-MCNC: 3.4 G/DL (ref 3.5–5)
ALBUMIN/GLOB SERPL: 0.8 {RATIO} (ref 1.1–2.2)
ALP SERPL-CCNC: 56 U/L (ref 45–117)
ALT SERPL-CCNC: 17 U/L (ref 12–78)
ANION GAP SERPL CALC-SCNC: 7 MMOL/L (ref 5–15)
AST SERPL-CCNC: 15 U/L (ref 15–37)
ATRIAL RATE: 61 BPM
BASOPHILS # BLD: 0.1 K/UL (ref 0–0.1)
BASOPHILS NFR BLD: 1 % (ref 0–1)
BILIRUB SERPL-MCNC: 0.2 MG/DL (ref 0.2–1)
BUN SERPL-MCNC: 14 MG/DL (ref 6–20)
BUN/CREAT SERPL: 18 (ref 12–20)
CALCIUM SERPL-MCNC: 8.9 MG/DL (ref 8.5–10.1)
CALCULATED P AXIS, ECG09: 63 DEGREES
CALCULATED R AXIS, ECG10: 56 DEGREES
CALCULATED T AXIS, ECG11: 56 DEGREES
CHLORIDE SERPL-SCNC: 107 MMOL/L (ref 97–108)
CO2 SERPL-SCNC: 28 MMOL/L (ref 21–32)
COMMENT, HOLDF: NORMAL
CREAT SERPL-MCNC: 0.78 MG/DL (ref 0.55–1.02)
DIAGNOSIS, 93000: NORMAL
DIFFERENTIAL METHOD BLD: NORMAL
EOSINOPHIL # BLD: 0.2 K/UL (ref 0–0.4)
EOSINOPHIL NFR BLD: 2 % (ref 0–7)
ERYTHROCYTE [DISTWIDTH] IN BLOOD BY AUTOMATED COUNT: 12.2 % (ref 11.5–14.5)
GLOBULIN SER CALC-MCNC: 4.2 G/DL (ref 2–4)
GLUCOSE SERPL-MCNC: 109 MG/DL (ref 65–100)
HCG UR QL: NEGATIVE
HCT VFR BLD AUTO: 36.2 % (ref 35–47)
HGB BLD-MCNC: 12.1 G/DL (ref 11.5–16)
IMM GRANULOCYTES # BLD: 0 K/UL (ref 0–0.04)
IMM GRANULOCYTES NFR BLD AUTO: 0 % (ref 0–0.5)
LYMPHOCYTES # BLD: 2.7 K/UL (ref 0.8–3.5)
LYMPHOCYTES NFR BLD: 29 % (ref 12–49)
MCH RBC QN AUTO: 29.7 PG (ref 26–34)
MCHC RBC AUTO-ENTMCNC: 33.4 G/DL (ref 30–36.5)
MCV RBC AUTO: 88.9 FL (ref 80–99)
MONOCYTES # BLD: 0.5 K/UL (ref 0–1)
MONOCYTES NFR BLD: 6 % (ref 5–13)
NEUTS SEG # BLD: 6.1 K/UL (ref 1.8–8)
NEUTS SEG NFR BLD: 63 % (ref 32–75)
NRBC # BLD: 0 K/UL (ref 0–0.01)
NRBC BLD-RTO: 0 PER 100 WBC
P-R INTERVAL, ECG05: 132 MS
PLATELET # BLD AUTO: 314 K/UL (ref 150–400)
PMV BLD AUTO: 9.3 FL (ref 8.9–12.9)
POTASSIUM SERPL-SCNC: 4.1 MMOL/L (ref 3.5–5.1)
PROT SERPL-MCNC: 7.6 G/DL (ref 6.4–8.2)
Q-T INTERVAL, ECG07: 410 MS
QRS DURATION, ECG06: 82 MS
QTC CALCULATION (BEZET), ECG08: 412 MS
RBC # BLD AUTO: 4.07 M/UL (ref 3.8–5.2)
SAMPLES BEING HELD,HOLD: NORMAL
SODIUM SERPL-SCNC: 142 MMOL/L (ref 136–145)
VENTRICULAR RATE, ECG03: 61 BPM
WBC # BLD AUTO: 9.6 K/UL (ref 3.6–11)

## 2018-11-28 PROCEDURE — 93005 ELECTROCARDIOGRAM TRACING: CPT

## 2018-11-28 PROCEDURE — 74011000250 HC RX REV CODE- 250: Performed by: EMERGENCY MEDICINE

## 2018-11-28 PROCEDURE — 74011250637 HC RX REV CODE- 250/637: Performed by: EMERGENCY MEDICINE

## 2018-11-28 PROCEDURE — 85025 COMPLETE CBC W/AUTO DIFF WBC: CPT

## 2018-11-28 PROCEDURE — 36415 COLL VENOUS BLD VENIPUNCTURE: CPT

## 2018-11-28 PROCEDURE — 74011250636 HC RX REV CODE- 250/636: Performed by: EMERGENCY MEDICINE

## 2018-11-28 PROCEDURE — 96374 THER/PROPH/DIAG INJ IV PUSH: CPT

## 2018-11-28 PROCEDURE — 96375 TX/PRO/DX INJ NEW DRUG ADDON: CPT

## 2018-11-28 PROCEDURE — 71275 CT ANGIOGRAPHY CHEST: CPT

## 2018-11-28 PROCEDURE — 99285 EMERGENCY DEPT VISIT HI MDM: CPT

## 2018-11-28 PROCEDURE — 80053 COMPREHEN METABOLIC PANEL: CPT

## 2018-11-28 PROCEDURE — 81025 URINE PREGNANCY TEST: CPT

## 2018-11-28 PROCEDURE — 74011636320 HC RX REV CODE- 636/320: Performed by: RADIOLOGY

## 2018-11-28 RX ORDER — KETOROLAC TROMETHAMINE 30 MG/ML
30 INJECTION, SOLUTION INTRAMUSCULAR; INTRAVENOUS
Status: COMPLETED | OUTPATIENT
Start: 2018-11-28 | End: 2018-11-28

## 2018-11-28 RX ORDER — IBUPROFEN 800 MG/1
800 TABLET ORAL
Qty: 20 TAB | Refills: 0 | Status: SHIPPED | OUTPATIENT
Start: 2018-11-28 | End: 2018-12-05

## 2018-11-28 RX ORDER — GLUCOSAMINE SULFATE 1500 MG
5000 POWDER IN PACKET (EA) ORAL DAILY
COMMUNITY
End: 2022-04-28 | Stop reason: ALTCHOICE

## 2018-11-28 RX ORDER — ONDANSETRON 2 MG/ML
4 INJECTION INTRAMUSCULAR; INTRAVENOUS
Status: COMPLETED | OUTPATIENT
Start: 2018-11-28 | End: 2018-11-28

## 2018-11-28 RX ADMIN — KETOROLAC TROMETHAMINE 30 MG: 30 INJECTION, SOLUTION INTRAMUSCULAR at 15:45

## 2018-11-28 RX ADMIN — FAMOTIDINE 20 MG: 10 INJECTION, SOLUTION INTRAVENOUS at 15:50

## 2018-11-28 RX ADMIN — ONDANSETRON 4 MG: 2 INJECTION INTRAMUSCULAR; INTRAVENOUS at 15:42

## 2018-11-28 RX ADMIN — IOPAMIDOL 100 ML: 755 INJECTION, SOLUTION INTRAVENOUS at 15:43

## 2018-11-28 RX ADMIN — LIDOCAINE HYDROCHLORIDE 40 ML: 20 SOLUTION ORAL; TOPICAL at 15:51

## 2018-11-28 NOTE — DISCHARGE INSTRUCTIONS

## 2018-11-28 NOTE — ED TRIAGE NOTES
Patient complains of right sided chest pain that extends to her right neck. She has difficulty and discomfort with swallowing. Symptoms have been present since last night, but she had palpitations intermittently since Thanksgiving.

## 2018-11-28 NOTE — TELEPHONE ENCOUNTER
Pt c/o heart palpitations x 1 wk, and chest pain since yesterday. Pt reports pain under R shoulder blade radiating into chest and difficulty swallowing. Pt is calling from work, VS checked by school nurse, BP: 140/90, P:75. O2sat 99%. Advised Pt to go to ED or urgent care to be evaluated. Pt verbalized understanding.

## 2018-11-28 NOTE — LETTER
[address] 
[facility] EMERGENCY DEPT 
[address] [facility] [id number] [phone] Work/School Note Date: 11/28/2018 To Whom It May concern: 
 
Orville Habermann was seen and treated today in the emergency room by the following provider(s): 
Attending Provider: Dania Treadwell MD.   
 
Orville Habermann may return to work on 11/30/2018. Sincerely, Luis Strickland RN

## 2018-11-28 NOTE — ED PROVIDER NOTES
41 y.o. female with past medical history significant for asthma and ectopic pregnancy who presents to the ED with chief complaint of chest pain. Pt reports right sided chest pain radiating to the right side of her neck onset yesterday evening at approximately 1830, rates pain 7/10. Pt states her pain \"feels like a sore muscle\" and she is also having pain when she swallows. Pt states she has also been having intermittent palpitations for the past 5 days. Pt denies nausea or vomiting. There are no other acute medical complaints voiced at this time. Social Hx: Never smoker. Uses EtOH. PCP: Cara mSith MD 
 
Note written by Saida Pizano, as dictated by Shaw Mosley MD 2:48 PM  
 
 
The history is provided by the patient and the spouse. Pt was evaluated at [facility] prior to arrival and had an elevated ddimer; she was referred to the ED for a chest CT. Past Medical History:  
Diagnosis Date  Abnormal Papanicolaou smear of cervix 2012  
 colpo with biopsy, expectant management last pap 2015 normal  
 Asthma  Ectopic pregnancy Past Surgical History:  
Procedure Laterality Date  HX GYN    
 ectopic pregnancy with fallopian tube removed Family History:  
Problem Relation Age of Onset  Colon Polyps Father  Cancer Maternal Aunt   
     breast  
 Cancer Paternal Grandmother   
     colon CA [place] Arthritis-osteo Mother  Other Mother   
     palpitations  Alcohol abuse Paternal Grandfather  Heart Disease Paternal Grandfather  Alcohol abuse Maternal Grandfather  Asthma Neg Hx  Diabetes Neg Hx Social History Socioeconomic History  Marital status:  Spouse name: Not on file  Number of children: Not on file  Years of education: Not on file  Highest education level: Not on file Social Needs  Financial resource strain: Not on file  Food insecurity - worry: Not on file  Food insecurity - inability: Not on file  Transportation needs - medical: Not on file  Transportation needs - non-medical: Not on file Occupational History  Not on file Tobacco Use  Smoking status: Never Smoker  Smokeless tobacco: Never Used Substance and Sexual Activity  Alcohol use: Yes Alcohol/week: 1.0 oz Types: 1 Glasses of wine, 1 Shots of liquor per week Comment: occasionally  Drug use: No  
 Sexual activity: Yes  
  Partners: Male Birth control/protection: None Other Topics Concern  Not on file Social History Narrative  Not on file ALLERGIES: Latex Review of Systems HENT: Positive for trouble swallowing. Cardiovascular: Positive for chest pain (right side) and palpitations. Gastrointestinal: Negative for nausea and vomiting. Musculoskeletal: Positive for neck pain (right side). All other systems reviewed and are negative. Vitals:  
 11/28/18 1450 BP: 142/78 Pulse: 70 Resp: 18 Temp: 98.4 °F (36.9 °C) SpO2: 100% Weight: 72.6 kg (160 lb) Height: 5' 5\" (1.651 m) Physical Exam  
Constitutional: She is oriented to person, place, and time. She appears well-developed and well-nourished. 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  
Head: Normocephalic. Eyes: EOM are normal. Pupils are equal, round, and reactive to light. Neck: Normal range of motion. Neck supple. Cardiovascular: Normal rate, regular rhythm, intact distal pulses and normal pulses. Pulmonary/Chest: Effort normal and breath sounds normal.  
Abdominal: Soft. Bowel sounds are normal. She exhibits no mass. There is no tenderness. There is no rebound and no guarding. Musculoskeletal: Normal range of motion. Right lower leg: Normal. She exhibits no tenderness and no edema. Left lower leg: Normal. She exhibits no tenderness and no edema. Neurological: She is alert and oriented to person, place, and time. Skin: Skin is warm and dry. No rash noted. Psychiatric: She has a normal mood and affect. Nursing note and vitals reviewed. MDM Procedures Pt is awaiting CT chest; Dr. Beau Marina aware and will assume care.  Jayne Farley NP 5:15 PM

## 2019-06-26 DIAGNOSIS — Z30.41 ENCOUNTER FOR SURVEILLANCE OF CONTRACEPTIVE PILLS: Primary | ICD-10-CM

## 2019-06-26 RX ORDER — NORETHINDRONE ACETATE AND ETHINYL ESTRADIOL 1; .02 MG/1; MG/1
1 TABLET ORAL DAILY
Qty: 3 PACKAGE | Refills: 3 | Status: SHIPPED | OUTPATIENT
Start: 2019-06-26 | End: 2020-02-07 | Stop reason: SDUPTHER

## 2019-06-28 ENCOUNTER — OFFICE VISIT (OUTPATIENT)
Dept: INTERNAL MEDICINE CLINIC | Age: 39
End: 2019-06-28

## 2019-06-28 ENCOUNTER — TELEPHONE (OUTPATIENT)
Dept: INTERNAL MEDICINE CLINIC | Age: 39
End: 2019-06-28

## 2019-06-28 VITALS
HEIGHT: 65 IN | TEMPERATURE: 98 F | DIASTOLIC BLOOD PRESSURE: 76 MMHG | OXYGEN SATURATION: 98 % | SYSTOLIC BLOOD PRESSURE: 108 MMHG | HEART RATE: 70 BPM | RESPIRATION RATE: 16 BRPM | BODY MASS INDEX: 28.82 KG/M2 | WEIGHT: 173 LBS

## 2019-06-28 DIAGNOSIS — J32.9 SINUSITIS, UNSPECIFIED CHRONICITY, UNSPECIFIED LOCATION: Primary | ICD-10-CM

## 2019-06-28 RX ORDER — PREDNISONE 20 MG/1
TABLET ORAL
Qty: 12 TAB | Refills: 0 | Status: SHIPPED | OUTPATIENT
Start: 2019-06-28 | End: 2019-11-26

## 2019-06-28 RX ORDER — AMOXICILLIN AND CLAVULANATE POTASSIUM 875; 125 MG/1; MG/1
1 TABLET, FILM COATED ORAL EVERY 12 HOURS
Qty: 20 TAB | Refills: 0 | Status: SHIPPED | OUTPATIENT
Start: 2019-06-28 | End: 2019-07-08

## 2019-06-28 NOTE — PATIENT INSTRUCTIONS
Sinusitis: Care Instructions Your Care Instructions Sinusitis is an infection of the lining of the sinus cavities in your head. Sinusitis often follows a cold. It causes pain and pressure in your head and face. In most cases, sinusitis gets better on its own in 1 to 2 weeks. But some mild symptoms may last for several weeks. Sometimes antibiotics are needed. Follow-up care is a key part of your treatment and safety. Be sure to make and go to all appointments, and call your doctor if you are having problems. It's also a good idea to know your test results and keep a list of the medicines you take. How can you care for yourself at home? · Take an over-the-counter pain medicine, such as acetaminophen (Tylenol), ibuprofen (Advil, Motrin), or naproxen (Aleve). Read and follow all instructions on the label. · If the doctor prescribed antibiotics, take them as directed. Do not stop taking them just because you feel better. You need to take the full course of antibiotics. · Be careful when taking over-the-counter cold or flu medicines and Tylenol at the same time. Many of these medicines have acetaminophen, which is Tylenol. Read the labels to make sure that you are not taking more than the recommended dose. Too much acetaminophen (Tylenol) can be harmful. · Breathe warm, moist air from a steamy shower, a hot bath, or a sink filled with hot water. Avoid cold, dry air. Using a humidifier in your home may help. Follow the directions for cleaning the machine. · Use saline (saltwater) nasal washes to help keep your nasal passages open and wash out mucus and bacteria. You can buy saline nose drops at a grocery store or drugstore. Or you can make your own at home by adding 1 teaspoon of salt and 1 teaspoon of baking soda to 2 cups of distilled water. If you make your own, fill a bulb syringe with the solution, insert the tip into your nostril, and squeeze gently. Luis Rolls your nose. · Put a hot, wet towel or a warm gel pack on your face 3 or 4 times a day for 5 to 10 minutes each time. · Try a decongestant nasal spray like oxymetazoline (Afrin). Do not use it for more than 3 days in a row. Using it for more than 3 days can make your congestion worse. When should you call for help? Call your doctor now or seek immediate medical care if: 
  · You have new or worse swelling or redness in your face or around your eyes.  
  · You have a new or higher fever.  
 Watch closely for changes in your health, and be sure to contact your doctor if: 
  · You have new or worse facial pain.  
  · The mucus from your nose becomes thicker (like pus) or has new blood in it.  
  · You are not getting better as expected. Where can you learn more? Go to http://kody-kimberlee.info/. Enter Z005 in the search box to learn more about \"Sinusitis: Care Instructions. \" Current as of: March 27, 2018 Content Version: 11.9 © 5703-6240 [employer]. Care instructions adapted under license by [employer] (which disclaims liability or warranty for this information). If you have questions about a medical condition or this instruction, always ask your healthcare professional. [address] any warranty or liability for your use of this information.

## 2019-06-28 NOTE — PROGRESS NOTES
Francis Jose is a 41 y.o. female who presents today for Ear Pain  . She has a history of   Patient Active Problem List   Diagnosis Code    Hx of ectopic pregnancy Z87.59    Encounter for repeat Papanicolaou smear of cervix due to previous unsatisfactory results R87.615   . Today patient is here for an acute visit. Amy Kenny Upper respiratory illness:  Francis Jose presents with complaints of congestion, sore throat, bilateral sinus pain and hoarseness for 3 weeks. no nausea and no vomiting . she has not had  fever and chills. Symptoms are moderate. [facility] and took ceftin for 10 days. She notes that she did improve during this time but since being back off it she is gotten room greatly worse again. Drinking plenty of fluids: yes  Asthma?:  no  non-smoker  Contacts with similar infections: no     ROS  Review of Systems   Constitutional: Negative for chills, fever and weight loss. HENT: Positive for sinus pain and sore throat. Negative for congestion, ear discharge, ear pain, hearing loss, nosebleeds and tinnitus. Respiratory: Negative for cough, hemoptysis, sputum production, shortness of breath, wheezing and stridor. Cardiovascular: Negative for chest pain, palpitations and leg swelling. Gastrointestinal: Negative for abdominal pain, nausea and vomiting. Skin: Negative for rash. Neurological: Negative. Endo/Heme/Allergies: Does not bruise/bleed easily. Psychiatric/Behavioral: Negative for depression. The patient is not nervous/anxious. Visit Vitals  /76 (BP 1 Location: Left arm, BP Patient Position: Sitting)   Pulse 70   Temp 98 °F (36.7 °C) (Oral)   Resp 16   Ht 5' 5\" (1.651 m)   Wt 173 lb (78.5 kg)   SpO2 98%   BMI 28.79 kg/m²       Physical Exam   Constitutional: She is oriented to person, place, and time. She appears well-developed and well-nourished. HENT:   Head: Normocephalic and atraumatic.    Right Ear: External ear normal.   Left Ear: External ear normal.   Mouth/Throat: Oropharynx is clear and moist. No oropharyngeal exudate. Bulging tympanic membranes   Cardiovascular: Normal rate and regular rhythm. No murmur heard. Pulmonary/Chest: Effort normal. No respiratory distress. Lungs clear no egophony no wheezing   Neurological: She is alert and oriented to person, place, and time. Skin: Skin is warm and dry. Psychiatric: She has a normal mood and affect. Her behavior is normal.         Current Outpatient Medications   Medication Sig    amoxicillin-clavulanate (AUGMENTIN) 875-125 mg per tablet Take 1 Tab by mouth every twelve (12) hours for 10 days.  predniSONE (DELTASONE) 20 mg tablet Take two tablets for 4 days then one for 4 days then stop    norethindrone-ethinyl estradiol (LOESTRIN 1/20, 21,) 1-20 mg-mcg tab Take 1 Tab by mouth daily.  cholecalciferol (VITAMIN D3) 1,000 unit cap Take 5,000 Units by mouth daily.  montelukast (SINGULAIR) 10 mg tablet TAKE 1 TABLET BY MOUTH EVERY DAY    MULTIVITAMIN PO Take  by mouth.  albuterol (PROAIR HFA) 90 mcg/actuation inhaler Take  by inhalation.  Cetirizine 10 mg cap Take  by mouth. No current facility-administered medications for this visit. Past Medical History:   Diagnosis Date    Abnormal Papanicolaou smear of cervix 2012    colpo with biopsy, expectant management last pap 2015 normal    Asthma     Bronchitis     Ectopic pregnancy       Past Surgical History:   Procedure Laterality Date    HX GYN      ectopic pregnancy with fallopian tube removed      Social History     Tobacco Use    Smoking status: Never Smoker    Smokeless tobacco: Never Used   Substance Use Topics    Alcohol use:  Yes     Alcohol/week: 1.0 oz     Types: 1 Glasses of wine, 1 Shots of liquor per week     Comment: occasionally      Family History   Problem Relation Age of Onset    Colon Polyps Father     Cancer Maternal Aunt         breast    Cancer Paternal Grandmother         colon CA    Arthritis-osteo Mother     Other Mother         palpitations    Alcohol abuse Paternal Grandfather     Heart Disease Paternal Grandfather     Alcohol abuse Maternal Grandfather     Asthma Neg Hx     Diabetes Neg Hx         Allergies   Allergen Reactions    Latex Rash    Other Medication Other (comments)     Ortho-tricycline (Birth Control) hives         Assessment/Plan  Diagnoses and all orders for this visit:    1. Sinusitis, unspecified chronicity, unspecified location -patient treated with a second generation cephalosporin and therefore was not fully covered for sinusitis. Will change to Augmentin and cover for full 10 days. Given pressure and history of reactive airways disease will give her a short taper of prednisone. Patient to take Mucinex during the first several days of therapy. -     amoxicillin-clavulanate (AUGMENTIN) 875-125 mg per tablet; Take 1 Tab by mouth every twelve (12) hours for 10 days. -     predniSONE (DELTASONE) 20 mg tablet; Take two tablets for 4 days then one for 4 days then stop        Follow-up and Dispositions    · Return if symptoms worsen or fail to improve. Charity Tracy MD  6/28/2019    This note was created with the help of speech recognition software [employer]) and may contain some 'sound alike' errors.

## 2019-06-28 NOTE — TELEPHONE ENCOUNTER
----- Message from Amadeo Pritchett sent at 6/28/2019  9:46 AM EDT -----  Regarding: Dr. Nils Holden   Pt is dealing with a ear infection and wanted to come in to see her PCP but she not able to currently with what time is available. She wanted to speak to her nurse or PCP to see if she can have a Rx for the issue. Best contact number is [phone].

## 2019-07-02 ENCOUNTER — HOSPITAL ENCOUNTER (OUTPATIENT)
Dept: MAMMOGRAPHY | Age: 39
Discharge: HOME | End: 2019-07-02
Attending: INTERNAL MEDICINE
Payer: COMMERCIAL

## 2019-07-02 DIAGNOSIS — Z12.31 VISIT FOR SCREENING MAMMOGRAM: ICD-10-CM

## 2019-07-02 PROCEDURE — 77067 SCR MAMMO BI INCL CAD: CPT

## 2019-07-26 ENCOUNTER — TELEPHONE (OUTPATIENT)
Dept: INTERNAL MEDICINE CLINIC | Age: 39
End: 2019-07-26

## 2019-07-26 RX ORDER — AZITHROMYCIN 250 MG/1
TABLET, FILM COATED ORAL
Qty: 6 TAB | Refills: 0 | Status: SHIPPED | OUTPATIENT
Start: 2019-07-26 | End: 2019-07-31

## 2019-07-26 NOTE — TELEPHONE ENCOUNTER
Pt reports ear pain and fullness, sneezing, sinus pressure and pain behind ears x 5 days. Pt has been taking OTC mucinex, but s/s have not subsided. Pt is nervous about driving home from [facility] and is requesting Rx sent to pharmacy if appropriate.

## 2019-07-26 NOTE — TELEPHONE ENCOUNTER
Z-pack sent to pharmacy as per provider. Advised Pt to f/u in office if s/s persist or worsen. Pt verbalized understanding.

## 2019-07-26 NOTE — TELEPHONE ENCOUNTER
Patient called to report that her ear infection seems to be back. Patient is in [facility] and would like to speak with nurse about calling in medication. Patient will have pharmacy information ready when nurse calls back.  [phone]

## 2019-08-02 ENCOUNTER — TELEPHONE (OUTPATIENT)
Dept: INTERNAL MEDICINE CLINIC | Age: 39
End: 2019-08-02

## 2019-08-02 NOTE — TELEPHONE ENCOUNTER
Pt c/o ongoing sinus pain. Pt has scheduled appt with ENT but was looking for recommendation for the weekend. Advised Pt to take Mucinex and call office early next week to f/u if needed.

## 2019-11-05 DIAGNOSIS — J30.9 ALLERGIC RHINITIS, UNSPECIFIED SEASONALITY, UNSPECIFIED TRIGGER: ICD-10-CM

## 2019-11-05 RX ORDER — MONTELUKAST SODIUM 10 MG/1
TABLET ORAL
Qty: 90 TAB | Refills: 3 | Status: SHIPPED | OUTPATIENT
Start: 2019-11-05 | End: 2020-09-19

## 2019-11-26 ENCOUNTER — OFFICE VISIT (OUTPATIENT)
Dept: INTERNAL MEDICINE CLINIC | Age: 39
End: 2019-11-26

## 2019-11-26 VITALS
HEIGHT: 65 IN | DIASTOLIC BLOOD PRESSURE: 80 MMHG | TEMPERATURE: 98.3 F | WEIGHT: 170 LBS | HEART RATE: 83 BPM | RESPIRATION RATE: 16 BRPM | OXYGEN SATURATION: 98 % | SYSTOLIC BLOOD PRESSURE: 118 MMHG | BODY MASS INDEX: 28.32 KG/M2

## 2019-11-26 DIAGNOSIS — M62.838 MUSCLE SPASM: ICD-10-CM

## 2019-11-26 DIAGNOSIS — E55.9 VITAMIN D DEFICIENCY: ICD-10-CM

## 2019-11-26 DIAGNOSIS — J45.901 REACTIVE AIRWAY DISEASE WITH ACUTE EXACERBATION, UNSPECIFIED ASTHMA SEVERITY, UNSPECIFIED WHETHER PERSISTENT: ICD-10-CM

## 2019-11-26 DIAGNOSIS — Z00.00 WELLNESS EXAMINATION: ICD-10-CM

## 2019-11-26 DIAGNOSIS — Z00.00 WELLNESS EXAMINATION: Primary | ICD-10-CM

## 2019-11-26 DIAGNOSIS — J30.9 ALLERGIC RHINITIS, UNSPECIFIED SEASONALITY, UNSPECIFIED TRIGGER: ICD-10-CM

## 2019-11-26 RX ORDER — AZELASTINE 1 MG/ML
1 SPRAY, METERED NASAL 2 TIMES DAILY
COMMUNITY
End: 2020-07-30 | Stop reason: SDUPTHER

## 2019-11-26 RX ORDER — AZITHROMYCIN 250 MG/1
250 TABLET, FILM COATED ORAL SEE ADMIN INSTRUCTIONS
Qty: 6 TAB | Refills: 0 | Status: SHIPPED | OUTPATIENT
Start: 2019-11-26 | End: 2019-11-26 | Stop reason: SDUPTHER

## 2019-11-26 RX ORDER — PREDNISONE 20 MG/1
TABLET ORAL
Qty: 9 TAB | Refills: 0 | Status: SHIPPED | OUTPATIENT
Start: 2019-11-26 | End: 2020-02-07 | Stop reason: ALTCHOICE

## 2019-11-26 RX ORDER — PREDNISONE 20 MG/1
TABLET ORAL
Qty: 9 TAB | Refills: 0 | Status: SHIPPED | OUTPATIENT
Start: 2019-11-26 | End: 2019-11-26 | Stop reason: SDUPTHER

## 2019-11-26 RX ORDER — AZITHROMYCIN 250 MG/1
250 TABLET, FILM COATED ORAL SEE ADMIN INSTRUCTIONS
Qty: 6 TAB | Refills: 0 | Status: SHIPPED | OUTPATIENT
Start: 2019-11-26 | End: 2019-12-01

## 2019-11-26 NOTE — PROGRESS NOTES
Jonas Vides is a 46 y.o. female who presents today for Complete Physical  .      She has a history of   Patient Active Problem List   Diagnosis Code    Hx of ectopic pregnancy Z87.59    Encounter for repeat Papanicolaou smear of cervix due to previous unsatisfactory results R87.615   . Today patient is here for complete physical exam..       Lungs and cough has been a bit worse. Now on nasal antihistamine. Earlier today she was having some wheezing. She is having some issues with catching her breath. She did have a sore throat and was coughing a bit this morning. Denies any fevers or chills. Her son has been home and sick sick with a GI bug. She does note occasional random muscle spasms. This could be a large or small muscles. Denies any pain to the area denies any trauma. This does occur more when she is exercising a bit more. She she reports that she does not stretch appropriately or drinking of fluids. Health maintenance hx includes:  Exercise: moderately active.  Form of exercise: Starting working out.     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 In [facility] [employer].  and child, and 2 step children. Screening:    Breast cancer screening: last mammogram 2019 and   was normal   Cervical cancer screening: last PAP/Pelvic exam: 2016   and was normal. OBGYN: [facility]; she will be calling to schedule this again. Immunizations:     Immunization History   Administered Date(s) Administered    Hepatitis B Vaccine 11/25/2011, 12/27/2011, 05/30/2012    Tdap 08/07/2014, 01/18/2016      Immunization status: up to date and documented. ROS  Review of Systems   Constitutional: Negative for chills, fever and weight loss. HENT: Positive for congestion and sinus pain. Negative for ear discharge, ear pain, hearing loss, sore throat and tinnitus.     Eyes: Negative for blurred vision, double vision and photophobia. Respiratory: Positive for cough. Negative for shortness of breath and stridor. Cardiovascular: Negative for chest pain, palpitations, orthopnea and leg swelling. Gastrointestinal: Negative for abdominal pain, constipation, diarrhea, heartburn, nausea and vomiting. Genitourinary: Negative for dysuria, frequency and urgency. Musculoskeletal: Negative for joint pain and myalgias. Occasional muscle spasms   Skin: Negative for rash. Neurological: Negative. Negative for headaches. Endo/Heme/Allergies: Does not bruise/bleed easily. Psychiatric/Behavioral: Negative for memory loss and suicidal ideas. Visit Vitals  /80 (BP 1 Location: Left arm, BP Patient Position: Sitting)   Pulse 83   Temp 98.3 °F (36.8 °C) (Oral)   Resp 16   Ht 5' 5\" (1.651 m)   Wt 170 lb (77.1 kg)   SpO2 98%   BMI 28.29 kg/m²       Physical Exam  Constitutional:       General: She is not in acute distress. Appearance: She is well-developed. HENT:      Head: Normocephalic and atraumatic. Comments: Fluid behind both tympanic membranes. Nose: Nose normal. No congestion. Mouth/Throat:      Mouth: Mucous membranes are dry. Eyes:      Extraocular Movements: Extraocular movements intact. Pupils: Pupils are equal, round, and reactive to light. Neck:      Musculoskeletal: Normal range of motion and neck supple. Thyroid: No thyromegaly. Cardiovascular:      Rate and Rhythm: Normal rate and regular rhythm. Heart sounds: No murmur. Pulmonary:      Effort: Pulmonary effort is normal.      Breath sounds: Normal breath sounds. No wheezing. Comments: Faint wheezes bilaterally. Abdominal:      General: Bowel sounds are normal. There is no distension. Palpations: Abdomen is soft. Musculoskeletal: Normal range of motion. Skin:     General: Skin is warm and dry. Neurological:      Mental Status: She is alert and oriented to person, place, and time. Cranial Nerves: No cranial nerve deficit. Psychiatric:         Behavior: Behavior normal.           Current Outpatient Medications   Medication Sig    montelukast (SINGULAIR) 10 mg tablet TAKE ONE TABLET BY MOUTH ONE TIME DAILY    norethindrone-ethinyl estradiol (LOESTRIN 1/20, 21,) 1-20 mg-mcg tab Take 1 Tab by mouth daily.  cholecalciferol (VITAMIN D3) 1,000 unit cap Take 5,000 Units by mouth daily.  MULTIVITAMIN PO Take  by mouth.  albuterol (PROAIR HFA) 90 mcg/actuation inhaler Take  by inhalation.  Cetirizine 10 mg cap Take  by mouth.  predniSONE (DELTASONE) 20 mg tablet Take two tablets for 4 days then one for 4 days then stop     No current facility-administered medications for this visit. Past Medical History:   Diagnosis Date    Abnormal Papanicolaou smear of cervix 2012    colpo with biopsy, expectant management last pap 2015 normal    Asthma     Bronchitis     Ectopic pregnancy       Past Surgical History:   Procedure Laterality Date    HX GYN      ectopic pregnancy with fallopian tube removed      Social History     Tobacco Use    Smoking status: Never Smoker    Smokeless tobacco: Never Used   Substance Use Topics    Alcohol use:  Yes     Alcohol/week: 1.7 standard drinks     Types: 1 Glasses of wine, 1 Shots of liquor per week     Comment: occasionally      Family History   Problem Relation Age of Onset    Colon Polyps Father     Cancer Maternal Aunt         breast    Breast Cancer Maternal Aunt     Cancer Paternal Grandmother         colon CA    Arthritis-osteo Mother     Other Mother         palpitations    Alcohol abuse Paternal Grandfather     Heart Disease Paternal Grandfather     Alcohol abuse Maternal Grandfather     Asthma Neg Hx     Diabetes Neg Hx         Allergies   Allergen Reactions    Latex Rash    Other Medication Other (comments)     Ortho-tricycline (Birth Control) hives         Assessment/Plan  Diagnoses and all orders for this visit: 1. Wellness examination -we will draw annual labs along with vitamin D level. Martha Galvan was counseled on age-appropriate/ guideline-based risk prevention behaviors and screening for a 46y.o. year old   female . We also discussed adjustments in screening based on family history if necessary. Printed instructions for preventative screening guidelines and healthy behaviors given to patient with after visit summary.    -     METABOLIC PANEL, COMPREHENSIVE; Future  -     LIPID PANEL; Future  -     CBC WITH AUTOMATED DIFF; Future    2. Allergic rhinitis, unspecified seasonality, unspecified trigger -reactive airway disease a bit worse recently. Given that she has a sick son at home will place her on azithromycin with prednisone taper. Continue antihistamine as well as Singulair and nasal antihistamine. 3. Muscle spasm-occasional.  We discussed importance of hydration and stretching before exercise. 4. Vitamin D deficiency  -     VITAMIN D, 25 HYDROXY; Future    5. Reactive airway disease with acute exacerbation, unspecified asthma severity, unspecified whether persistent  -     azithromycin (ZITHROMAX) 250 mg tablet; Take 1 Tab by mouth See Admin Instructions for 5 days. -     predniSONE (DELTASONE) 20 mg tablet; Take two tablets for 3, then one for 3 days then stop. Deanna Gonzalez MD  11/26/2019    This note was created with the help of speech recognition software [employer]) and may contain some 'sound alike' errors.

## 2019-11-26 NOTE — PATIENT INSTRUCTIONS
Well Visit, Ages 25 to 48: Care Instructions Your Care Instructions Physical exams can help you stay healthy. Your doctor has checked your overall health and may have suggested ways to take good care of yourself. He or she also may have recommended tests. At home, you can help prevent illness with healthy eating, regular exercise, and other steps. Follow-up care is a key part of your treatment and safety. Be sure to make and go to all appointments, and call your doctor if you are having problems. It's also a good idea to know your test results and keep a list of the medicines you take. How can you care for yourself at home? · Reach and stay at a healthy weight. This will lower your risk for many problems, such as obesity, diabetes, heart disease, and high blood pressure. · Get at least 30 minutes of physical activity on most days of the week. Walking is a good choice. You also may want to do other activities, such as running, swimming, cycling, or playing tennis or team sports. Discuss any changes in your exercise program with your doctor. · Do not smoke or allow others to smoke around you. If you need help quitting, talk to your doctor about stop-smoking programs and medicines. These can increase your chances of quitting for good. · Talk to your doctor about whether you have any risk factors for sexually transmitted infections (STIs). Having one sex partner (who does not have STIs and does not have sex with anyone else) is a good way to avoid these infections. · Use birth control if you do not want to have children at this time. Talk with your doctor about the choices available and what might be best for you. · Protect your skin from too much sun. When you're outdoors from 10 a.m. to 4 p.m., stay in the shade or cover up with clothing and a hat with a wide brim. Wear sunglasses that block UV rays. Even when it's cloudy, put broad-spectrum sunscreen (SPF 30 or higher) on any exposed skin. · See a dentist one or two times a year for checkups and to have your teeth cleaned. · Wear a seat belt in the car. Follow your doctor's advice about when to have certain tests. These tests can spot problems early. For everyone · Cholesterol. Have the fat (cholesterol) in your blood tested after age 21. Your doctor will tell you how often to have this done based on your age, family history, or other things that can increase your risk for heart disease. · Blood pressure. Have your blood pressure checked during a routine doctor visit. Your doctor will tell you how often to check your blood pressure based on your age, your blood pressure results, and other factors. · Vision. Talk with your doctor about how often to have a glaucoma test. 
· Diabetes. Ask your doctor whether you should have tests for diabetes. · Colon cancer. Your risk for colorectal cancer gets higher as you get older. Some experts say that adults should start regular screening at age 48 and stop at age 76. Others say to start before age 48 or continue after age 76. Talk with your doctor about your risk and when to start and stop screening. For women · Breast exam and mammogram. Talk to your doctor about when you should have a clinical breast exam and a mammogram. Medical experts differ on whether and how often women under 50 should have these tests. Your doctor can help you decide what is right for you. · Cervical cancer screening test and pelvic exam. Begin with a Pap test at age 24. The test often is part of a pelvic exam. Starting at age 27, you may choose to have a Pap test, an HPV test, or both tests at the same time (called co-testing). Talk with your doctor about how often to have testing. · Tests for sexually transmitted infections (STIs). Ask whether you should have tests for STIs. You may be at risk if you have sex with more than one person, especially if your partners do not wear condoms. For men · Tests for sexually transmitted infections (STIs). Ask whether you should have tests for STIs. You may be at risk if you have sex with more than one person, especially if you do not wear a condom. · Testicular cancer exam. Ask your doctor whether you should check your testicles regularly. · Prostate exam. Talk to your doctor about whether you should have a blood test (called a PSA test) for prostate cancer. Experts differ on whether and when men should have this test. Some experts suggest it if you are older than 39 and are -American or have a father or brother who got prostate cancer when he was younger than 72. When should you call for help? Watch closely for changes in your health, and be sure to contact your doctor if you have any problems or symptoms that concern you. Where can you learn more? Go to http://kody-kimberlee.info/. Enter P072 in the search box to learn more about \"Well Visit, Ages 25 to 48: Care Instructions. \" Current as of: December 13, 2018 Content Version: 12.2 © 2484-7588 [employer]. Care instructions adapted under license by [employer] (which disclaims liability or warranty for this information). If you have questions about a medical condition or this instruction, always ask your healthcare professional. [address] any warranty or liability for your use of this information.

## 2019-11-27 LAB
25(OH)D3+25(OH)D2 SERPL-MCNC: 50.3 NG/ML (ref 30–100)
ALBUMIN SERPL-MCNC: 4.3 G/DL (ref 3.5–5.5)
ALBUMIN/GLOB SERPL: 1.5 {RATIO} (ref 1.2–2.2)
ALP SERPL-CCNC: 55 IU/L (ref 39–117)
ALT SERPL-CCNC: 11 IU/L (ref 0–32)
AST SERPL-CCNC: 15 IU/L (ref 0–40)
BASOPHILS # BLD AUTO: 0.1 X10E3/UL (ref 0–0.2)
BASOPHILS NFR BLD AUTO: 1 %
BILIRUB SERPL-MCNC: 0.3 MG/DL (ref 0–1.2)
BUN SERPL-MCNC: 11 MG/DL (ref 6–20)
BUN/CREAT SERPL: 15 (ref 9–23)
CALCIUM SERPL-MCNC: 9.6 MG/DL (ref 8.7–10.2)
CHLORIDE SERPL-SCNC: 102 MMOL/L (ref 96–106)
CHOLEST SERPL-MCNC: 194 MG/DL (ref 100–199)
CO2 SERPL-SCNC: 22 MMOL/L (ref 20–29)
CREAT SERPL-MCNC: 0.74 MG/DL (ref 0.57–1)
EOSINOPHIL # BLD AUTO: 0.3 X10E3/UL (ref 0–0.4)
EOSINOPHIL NFR BLD AUTO: 5 %
ERYTHROCYTE [DISTWIDTH] IN BLOOD BY AUTOMATED COUNT: 11.7 % (ref 12.3–15.4)
GLOBULIN SER CALC-MCNC: 2.9 G/DL (ref 1.5–4.5)
GLUCOSE SERPL-MCNC: 68 MG/DL (ref 65–99)
HCT VFR BLD AUTO: 38.1 % (ref 34–46.6)
HDLC SERPL-MCNC: 44 MG/DL
HGB BLD-MCNC: 12.8 G/DL (ref 11.1–15.9)
IMM GRANULOCYTES # BLD AUTO: 0 X10E3/UL (ref 0–0.1)
IMM GRANULOCYTES NFR BLD AUTO: 0 %
INTERPRETATION, 910389: NORMAL
LDLC SERPL CALC-MCNC: 128 MG/DL (ref 0–99)
LYMPHOCYTES # BLD AUTO: 2.9 X10E3/UL (ref 0.7–3.1)
LYMPHOCYTES NFR BLD AUTO: 47 %
MCH RBC QN AUTO: 30.1 PG (ref 26.6–33)
MCHC RBC AUTO-ENTMCNC: 33.6 G/DL (ref 31.5–35.7)
MCV RBC AUTO: 90 FL (ref 79–97)
MONOCYTES # BLD AUTO: 0.5 X10E3/UL (ref 0.1–0.9)
MONOCYTES NFR BLD AUTO: 7 %
NEUTROPHILS # BLD AUTO: 2.5 X10E3/UL (ref 1.4–7)
NEUTROPHILS NFR BLD AUTO: 40 %
PLATELET # BLD AUTO: 365 X10E3/UL (ref 150–450)
POTASSIUM SERPL-SCNC: 4.3 MMOL/L (ref 3.5–5.2)
PROT SERPL-MCNC: 7.2 G/DL (ref 6–8.5)
RBC # BLD AUTO: 4.25 X10E6/UL (ref 3.77–5.28)
SODIUM SERPL-SCNC: 141 MMOL/L (ref 134–144)
TRIGL SERPL-MCNC: 112 MG/DL (ref 0–149)
VLDLC SERPL CALC-MCNC: 22 MG/DL (ref 5–40)
WBC # BLD AUTO: 6.1 X10E3/UL (ref 3.4–10.8)

## 2020-02-07 ENCOUNTER — OFFICE VISIT (OUTPATIENT)
Dept: OBGYN CLINIC | Age: 40
End: 2020-02-07

## 2020-02-07 VITALS — DIASTOLIC BLOOD PRESSURE: 86 MMHG | SYSTOLIC BLOOD PRESSURE: 122 MMHG | WEIGHT: 173 LBS | BODY MASS INDEX: 28.79 KG/M2

## 2020-02-07 DIAGNOSIS — Z01.419 ENCOUNTER FOR GYNECOLOGICAL EXAMINATION: Primary | ICD-10-CM

## 2020-02-07 DIAGNOSIS — Z30.41 ENCOUNTER FOR SURVEILLANCE OF CONTRACEPTIVE PILLS: ICD-10-CM

## 2020-02-07 RX ORDER — NORETHINDRONE ACETATE AND ETHINYL ESTRADIOL 1; .02 MG/1; MG/1
1 TABLET ORAL DAILY
Qty: 3 PACKAGE | Refills: 4 | Status: SHIPPED | OUTPATIENT
Start: 2020-02-07 | End: 2021-02-24 | Stop reason: SDUPTHER

## 2020-02-07 NOTE — PROGRESS NOTES
Annual exam ages 22-44    [address] Annabella Mckoy is a ,  46 y.o. female [address] Patient's last menstrual period was 2020 (exact date). , who presents for her annual checkup. Prev pt of midwives, last seen 7/10/18 for AE    Since her last annual GYN she has had the following changes in her health history: none    ADDITIONAL CONCERNS:  She is having no significant problems. With regard to the Gardasil vaccine, she has not received it yet. Menstrual status:    Her periods are light in flow. She is using three to five pads or tampons per day, usually regular and last 26-30 days. She has dysmenorrhea. She has premenstrual symptoms. Contraception:    The current method of family planning is OCP (estrogen/progesterone). Sexual history:     She  reports being sexually active and has had partner(s) who are Male. She reports using the following method of birth control/protection: None. .        Pap and Mammogram History:    Her most recent Pap smear was  Normal (pt reported). She does have a history of abnormal paps. ()    The patient had her mammogram in 2019.     Breast Cancer History/Substance Abuse: denies    Past Medical History:   Diagnosis Date    Abnormal Papanicolaou smear of cervix     colpo with biopsy, expectant management last pap 2016 normal    Asthma     Bronchitis     Ectopic pregnancy      Past Surgical History:   Procedure Laterality Date    HX GYN      ectopic pregnancy with fallopian tube removed     OB History    Para Term  AB Living   2 1 1   1 1   SAB TAB Ectopic Molar Multiple Live Births   0 0 1   0 1      # Outcome Date GA Lbr Christiano/2nd Weight Sex Delivery Anes PTL Lv   2 Term 16 38w1d 07:43 / 05:49 6 lb 11.1 oz (3.035 kg) M VAGINAL DELI EPIDURAL AN N JOSEFA   1 Ectopic 2007              Birth Comments: R ectopic - salpingectomy       Current Outpatient Medications   Medication Sig Dispense Refill    norethindrone-ethinyl estradiol (LOESTRIN 1/20, 21,) 1-20 mg-mcg tablet Take 1 Tab by mouth daily. 3 Package 4    azelastine (ASTELIN) 137 mcg (0.1 %) nasal spray 1 Spray two (2) times a day. Use in each nostril as directed      montelukast (SINGULAIR) 10 mg tablet TAKE ONE TABLET BY MOUTH ONE TIME DAILY 90 Tab 3    MULTIVITAMIN PO Take  by mouth.  albuterol (PROAIR HFA) 90 mcg/actuation inhaler Take  by inhalation.  cholecalciferol (VITAMIN D3) 1,000 unit cap Take 5,000 Units by mouth daily. Allergies: Latex; Montelukast; Other medication; and Zyrtec [cetirizine]   Social History     Socioeconomic History    Marital status:      Spouse name: Not on file    Number of children: Not on file    Years of education: Not on file    Highest education level: Not on file   Occupational History    Not on file   Social Needs    Financial resource strain: Not on file    Food insecurity:     Worry: Not on file     Inability: Not on file    Transportation needs:     Medical: Not on file     Non-medical: Not on file   Tobacco Use    Smoking status: Never Smoker    Smokeless tobacco: Never Used   Substance and Sexual Activity    Alcohol use:  Yes     Alcohol/week: 1.7 standard drinks     Types: 1 Glasses of wine, 1 Shots of liquor per week     Comment: occasionally    Drug use: No    Sexual activity: Yes     Partners: Male     Birth control/protection: None   Lifestyle    Physical activity:     Days per week: Not on file     Minutes per session: Not on file    Stress: Not on file   Relationships    Social connections:     Talks on phone: Not on file     Gets together: Not on file     Attends Hoahaoism service: Not on file     Active member of club or organization: Not on file     Attends meetings of clubs or organizations: Not on file     Relationship status: Not on file    Intimate partner violence:     Fear of current or ex partner: Not on file     Emotionally abused: Not on file Physically abused: Not on file     Forced sexual activity: Not on file   Other Topics Concern    Not on file   Social History Narrative    Not on file     Tobacco History:  reports that she has never smoked. She has never used smokeless tobacco.  Alcohol Abuse:  reports current alcohol use of about 1.7 standard drinks of alcohol per week. Drug Abuse:  reports no history of drug use.     Patient Active Problem List   Diagnosis Code    Hx of ectopic pregnancy Z87.59    Encounter for repeat Papanicolaou smear of cervix due to previous unsatisfactory results R87.615     Family History   Problem Relation Age of Onset    Colon Polyps Father     Breast Cancer Maternal Aunt     Colon Cancer Paternal Grandmother         colon CA    Arthritis-osteo Mother     Other Mother         palpitations    Alcohol abuse Paternal Grandfather     Heart Disease Paternal Grandfather     Alcohol abuse Maternal Grandfather     Asthma Neg Hx     Diabetes Neg Hx        Review of Systems - History obtained from the patient  Constitutional: negative for weight loss, fever, night sweats  HEENT: negative for hearing loss, earache, congestion, snoring, sorethroat  CV: negative for chest pain, palpitations, edema  Resp: negative for cough, shortness of breath, wheezing  GI: negative for change in bowel habits, abdominal pain, black or bloody stools  : negative for frequency, dysuria, hematuria, vaginal discharge  MSK: negative for back pain, joint pain, muscle pain  Breast: negative for breast lumps, nipple discharge, galactorrhea  Skin :negative for itching, rash, hives  Neuro: negative for dizziness, headache, confusion, weakness  Psych: negative for anxiety, depression, change in mood  Heme/lymph: negative for bleeding, bruising, pallor    Physical Exam    Visit Vitals  /86 (BP 1 Location: Left arm, BP Patient Position: Sitting)   Wt 173 lb (78.5 kg)   LMP 01/22/2020 (Exact Date)   BMI 28.79 kg/m² Constitutional  · Appearance: well-nourished, well developed, alert, in no acute distress    HENT  · Head and Face: appears normal    Neck  · Inspection/Palpation: normal appearance, no masses or tenderness  · Lymph Nodes: no lymphadenopathy present  · Thyroid: gland size normal, nontender, no nodules or masses present on palpation    Chest  · Respiratory Effort: breathing unlabored  · Auscultation: normal breath sounds    Cardiovascular  · Heart:  · Auscultation: regular rate and rhythm without murmur    Breasts  · Inspection of Breasts: breasts symmetrical, no skin changes, no discharge present, nipple appearance normal, no skin retraction present  · Palpation of Breasts and Axillae: no masses present on palpation, no breast tenderness  · Axillary Lymph Nodes: no lymphadenopathy present    Gastrointestinal  · Abdominal Examination: abdomen non-tender to palpation, normal bowel sounds, no masses present  · Liver and spleen: no hepatomegaly present, spleen not palpable  · Hernias: no hernias identified    Genitourinary  · External Genitalia: normal appearance for age, no discharge present, no tenderness present, no inflammatory lesions present, no masses present, no atrophy present  · Vagina: normal vaginal vault without central or paravaginal defects, no discharge present, no inflammatory lesions present, no masses present  · Bladder: non-tender to palpation  · Urethra: appears normal  · Cervix: normal   · Uterus: normal size, shape and consistency  · Adnexa: no adnexal tenderness present, no adnexal masses present  · Perineum: perineum within normal limits, no evidence of trauma, no rashes or skin lesions present  · Anus: anus within normal limits, no hemorrhoids present  · Inguinal Lymph Nodes: no lymphadenopathy present    Skin  · General Inspection: no rash, no lesions identified    Neurologic/Psychiatric  · Mental Status:  · Orientation: grossly oriented to person, place and time  · Mood and Affect: mood normal, affect appropriate        Assessment & Plan:  · Routine gynecologic examination. Pap/HPV neg 11/11/16. Pap/HPV today at patient's request.  · H/o abnl pap 2011. No tx, reverted to nl. Pap/HPV as above. · Her current medical status is satisfactory with no evidence of significant gynecologic issues. · Cycles well controlled on OCPs, wishes to continue. eRX #3 RFx4  · Counseled re: diet, exercise, healthy lifestyle  · Return for yearly wellness visits  · Pt counseled regarding co-testing for high risk HPV with pap  · Rec screening mammo @ 42yo  · Patient verbalized understanding    Orders Placed This Encounter    norethindrone-ethinyl estradiol (LOESTRIN 1/20, 21,) 1-20 mg-mcg tablet     Sig: Take 1 Tab by mouth daily. Dispense:  3 Package     Refill:  4    PAP IG, HPV AND RFX HPV 85/16,74([id number])     Order Specific Question:   Pap Source? Answer:   Cervical and Endocervical     Order Specific Question:   Total Hysterectomy? Answer:   No     Order Specific Question:   Supracervical Hysterectomy? Answer:   No     Order Specific Question:   Post Menopausal?     Answer:   No     Order Specific Question:   Hormone Therapy? Answer:   No     Order Specific Question:   IUD? Answer:   No     Order Specific Question:   Abnormal Bleeding? Answer:   No     Order Specific Question:   Pregnant     Answer:   No     Order Specific Question:   Post Partum? Answer:    No

## 2020-02-12 LAB
CYTOLOGIST CVX/VAG CYTO: NORMAL
CYTOLOGY CVX/VAG DOC CYTO: NORMAL
CYTOLOGY CVX/VAG DOC THIN PREP: NORMAL
CYTOLOGY HISTORY:: NORMAL
DX ICD CODE: NORMAL
HPV I/H RISK 1 DNA CVX QL PROBE+SIG AMP: NEGATIVE
Lab: NORMAL
OTHER STN SPEC: NORMAL
STAT OF ADQ CVX/VAG CYTO-IMP: NORMAL

## 2020-04-21 ENCOUNTER — VIRTUAL VISIT (OUTPATIENT)
Dept: INTERNAL MEDICINE CLINIC | Age: 40
End: 2020-04-21

## 2020-04-21 VITALS — HEIGHT: 65 IN | WEIGHT: 172 LBS | BODY MASS INDEX: 28.66 KG/M2 | TEMPERATURE: 97.7 F

## 2020-04-21 DIAGNOSIS — J30.9 ALLERGIC RHINITIS, UNSPECIFIED SEASONALITY, UNSPECIFIED TRIGGER: ICD-10-CM

## 2020-04-21 DIAGNOSIS — J32.9 SINUSITIS, UNSPECIFIED CHRONICITY, UNSPECIFIED LOCATION: Primary | ICD-10-CM

## 2020-04-21 RX ORDER — CETIRIZINE HCL 10 MG
TABLET ORAL
COMMUNITY

## 2020-04-21 RX ORDER — FLUTICASONE PROPIONATE 50 MCG
2 SPRAY, SUSPENSION (ML) NASAL DAILY
COMMUNITY

## 2020-04-21 RX ORDER — GUAIFENESIN 600 MG/1
600 TABLET, EXTENDED RELEASE ORAL 2 TIMES DAILY
Qty: 10 TAB | Refills: 0 | Status: SHIPPED | OUTPATIENT
Start: 2020-04-21 | End: 2020-04-26

## 2020-04-21 RX ORDER — AMOXICILLIN AND CLAVULANATE POTASSIUM 875; 125 MG/1; MG/1
1 TABLET, FILM COATED ORAL EVERY 12 HOURS
Qty: 14 TAB | Refills: 0 | Status: SHIPPED | OUTPATIENT
Start: 2020-04-21 | End: 2020-04-28

## 2020-04-21 NOTE — PROGRESS NOTES
Symptoms started this past Thursday. Pt has tried sinus rinse, and takes antihistamine. Pt used at home thermometer, and scale for her VV today.  Pt doesn't;t have BP machine

## 2020-04-21 NOTE — PROGRESS NOTES
Marylin Zamora was seen on 4/21/2020 using synchronous (real-time) audio-video technology; doxy. me. Consent:  Patient and/or healthcare decision maker is aware that this patient-initiated Telehealth encounter is a billable service, with coverage as determined by their insurance carrier. Patient is aware that they may receive a bill and has provided verbal consent to proceed: Yes     I was in the office while conducting this encounter. Marylin Zamora is a 46 y.o. female who presents today for Ear Fullness; Sinus Pain; and Nasal Congestion  . She has a history of   Patient Active Problem List   Diagnosis Code    Hx of ectopic pregnancy Z87.59    Encounter for repeat Papanicolaou smear of cervix due to previous unsatisfactory results R87.615   . Today patient is being seen for an acute visit. Upper respiratory illness:  Marylin Zamora presents with complaints of congestion, nasal blockage, post nasal drip and bilateral sinus pain for 10 days. no nausea and no vomiting . she has not had  dry cough, productive cough, fever and chills. Symptoms are moderate. Over-the-counter remedies including Allergy medication. Was outdoors last week. Drinking plenty of fluids: yes  Asthma?:  yes  non-smoker  Contacts with similar infections: no     Patient has been taking Zyrtec as well as Singulair and nasal antihistamine. She does take albuterol as needed. ROS  Review of Systems   Constitutional: Negative for chills, fever and weight loss. HENT: Positive for congestion, sinus pain and sore throat. Negative for ear discharge, ear pain, hearing loss and tinnitus. Eyes: Negative for blurred vision, double vision and photophobia. Respiratory: Negative for cough, hemoptysis, sputum production and shortness of breath. Cardiovascular: Negative for chest pain, palpitations, orthopnea, claudication and leg swelling.    Gastrointestinal: Negative for abdominal pain, constipation, diarrhea, heartburn, nausea and vomiting. Genitourinary: Negative for dysuria, frequency and urgency. Musculoskeletal: Negative for joint pain and myalgias. Skin: Negative for rash. Neurological: Negative. Negative for headaches. Endo/Heme/Allergies: Does not bruise/bleed easily. Psychiatric/Behavioral: Negative for memory loss and suicidal ideas. Visit Vitals  Temp 97.7 °F (36.5 °C) (Oral)   Ht 5' 5\" (1.651 m)   Wt 172 lb (78 kg)   BMI 28.62 kg/m²       Physical Exam  Constitutional:       Appearance: Normal appearance. HENT:      Head: Normocephalic and atraumatic. Pulmonary:      Effort: Pulmonary effort is normal. No respiratory distress. Comments: speaking in full sentences  Neurological:      General: No focal deficit present. Mental Status: She is alert and oriented to person, place, and time. Psychiatric:         Mood and Affect: Mood normal.         Behavior: Behavior normal.           Current Outpatient Medications   Medication Sig    cetirizine (ZyrTEC) 10 mg tablet Take  by mouth.  fluticasone propionate (Flonase Allergy Relief) 50 mcg/actuation nasal spray 2 Sprays by Both Nostrils route daily.  guaiFENesin ER (MUCINEX) 600 mg ER tablet Take 1 Tab by mouth two (2) times a day for 5 days.  amoxicillin-clavulanate (AUGMENTIN) 875-125 mg per tablet Take 1 Tab by mouth every twelve (12) hours for 7 days.  norethindrone-ethinyl estradiol (LOESTRIN 1/20, 21,) 1-20 mg-mcg tablet Take 1 Tab by mouth daily.  azelastine (ASTELIN) 137 mcg (0.1 %) nasal spray 1 Spray two (2) times a day. Use in each nostril as directed    montelukast (SINGULAIR) 10 mg tablet TAKE ONE TABLET BY MOUTH ONE TIME DAILY    cholecalciferol (VITAMIN D3) 1,000 unit cap Take 5,000 Units by mouth daily.  MULTIVITAMIN PO Take  by mouth.  albuterol (PROAIR HFA) 90 mcg/actuation inhaler Take  by inhalation. No current facility-administered medications for this visit.          Past Medical History: Diagnosis Date    Abnormal Papanicolaou smear of cervix 2012    colpo with biopsy, expectant management last pap 2016 normal    Asthma     Bronchitis     Ectopic pregnancy 2007    Routine Papanicolaou smear 02/07/2020    Negative ; HVP Negative       Past Surgical History:   Procedure Laterality Date    HX GYN      ectopic pregnancy with fallopian tube removed      Social History     Tobacco Use    Smoking status: Never Smoker    Smokeless tobacco: Never Used   Substance Use Topics    Alcohol use: Yes     Alcohol/week: 1.7 standard drinks     Types: 1 Glasses of wine, 1 Shots of liquor per week     Comment: occasionally      Family History   Problem Relation Age of Onset    Colon Polyps Father     Breast Cancer Maternal Aunt     Colon Cancer Paternal Grandmother         colon CA    Arthritis-osteo Mother     Other Mother         palpitations    Alcohol abuse Paternal Grandfather     Heart Disease Paternal Grandfather     Alcohol abuse Maternal Grandfather     Asthma Neg Hx     Diabetes Neg Hx         Allergies   Allergen Reactions    Latex Rash    Other Medication Other (comments)     Ortho-tricycline (Birth Control) hives         Assessment/Plan  Diagnoses and all orders for this visit:    1. Sinusitis, unspecified chronicity, unspecified location - continue netti pot and allergy treatment. Suggest using Mucinex for coming several days and if not better by the end of the week taking a 7-day course of antibiotics. -     amoxicillin-clavulanate (AUGMENTIN) 875-125 mg per tablet; Take 1 Tab by mouth every twelve (12) hours for 7 days. 2. Allergic rhinitis, unspecified seasonality, unspecified trigger  -     guaiFENesin ER (MUCINEX) 600 mg ER tablet; Take 1 Tab by mouth two (2) times a day for 5 days. Ilene Watson is a 46 y.o. female being evaluated by a video visit encounter for concerns as above. A caregiver was present when appropriate.  Due to this being a TeleHealth encounter (During KGEBU-60 public health emergency), evaluation of the following organ systems was limited: Vitals/Constitutional/EENT/Resp/CV/GI//MS/Neuro/Skin/Heme-Lymph-Imm. Pursuant to the emergency declaration under the [facility], [facility] waiver authority and the [employer] Act, this Virtual  Visit was conducted, with patient's (and/or legal guardian's) consent, to reduce the patient's risk of exposure to COVID-19 and provide necessary medical care. Services were provided through a video synchronous discussion virtually to substitute for in-person clinic visit. Patient and provider were located at their individual homes. Yesenia Urrutia MD  4/21/2020    This note was created with the help of speech recognition software Gris Rodriguez) and may contain some 'sound alike' errors.

## 2020-07-29 ENCOUNTER — PATIENT MESSAGE (OUTPATIENT)
Dept: INTERNAL MEDICINE CLINIC | Age: 40
End: 2020-07-29

## 2020-07-30 NOTE — TELEPHONE ENCOUNTER
From: Farzana Moreno  To: Manuel Lagos MD  Sent: 7/29/2020 11:38 AM EDT  Subject: Prescription Question    Would it be possible if Dr. Junior Yoo could refill my azelastine 137 mcg (0.1 %) nasal spray? I no longer see the ear, nose and throat specialist and this medication is very helpful for controlling sinus/ear infections, which I have visited Dr. Junior Yoo for in the past. Thank you for all your help!

## 2020-07-31 RX ORDER — AZELASTINE 1 MG/ML
1 SPRAY, METERED NASAL 2 TIMES DAILY
Qty: 1 BOTTLE | Refills: 1 | Status: SHIPPED | OUTPATIENT
Start: 2020-07-31 | End: 2020-08-27

## 2020-08-27 RX ORDER — AZELASTINE 1 MG/ML
SPRAY, METERED NASAL
Qty: 1 BOTTLE | Refills: 1 | Status: SHIPPED | OUTPATIENT
Start: 2020-08-27 | End: 2020-12-29

## 2020-09-19 DIAGNOSIS — J30.9 ALLERGIC RHINITIS, UNSPECIFIED SEASONALITY, UNSPECIFIED TRIGGER: ICD-10-CM

## 2020-09-19 RX ORDER — MONTELUKAST SODIUM 10 MG/1
TABLET ORAL
Qty: 90 TAB | Refills: 3 | Status: SHIPPED | OUTPATIENT
Start: 2020-09-19 | End: 2021-11-12

## 2020-10-29 ENCOUNTER — PATIENT MESSAGE (OUTPATIENT)
Dept: INTERNAL MEDICINE CLINIC | Age: 40
End: 2020-10-29

## 2020-10-29 NOTE — TELEPHONE ENCOUNTER
From: Yenifer Arshad  To: Lucretia Acevedo MD  Sent: 10/29/2020 2:32 PM EDT  Subject: Referral Request    Good afternoon, I hope that you and your family are doing well during these challenging times. 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 The expectation is for us to return. The [facility] is allowing teachers who have underlying health conditions the option to remain virtual, but we have to provide documentation from our doctor. As you know, I have been dealing with asthma, and upper respiratory issues for a while. I have fought off bronchitis every year since 2011 and I and terrified at this point about catching Covid-19 a I was wondering if you would be able to provide a letter stating my health issues? Thank you so much in advance.

## 2020-11-03 ENCOUNTER — PATIENT MESSAGE (OUTPATIENT)
Dept: INTERNAL MEDICINE CLINIC | Age: 40
End: 2020-11-03

## 2020-11-04 ENCOUNTER — VIRTUAL VISIT (OUTPATIENT)
Dept: INTERNAL MEDICINE CLINIC | Age: 40
End: 2020-11-04
Payer: COMMERCIAL

## 2020-11-04 DIAGNOSIS — J30.9 ALLERGIC RHINITIS, UNSPECIFIED SEASONALITY, UNSPECIFIED TRIGGER: ICD-10-CM

## 2020-11-04 DIAGNOSIS — Z02.89 ENCOUNTER FOR COMPLETION OF FORM WITH PATIENT: Primary | ICD-10-CM

## 2020-11-04 DIAGNOSIS — M67.431 GANGLION CYST OF DORSUM OF RIGHT WRIST: ICD-10-CM

## 2020-11-04 DIAGNOSIS — J45.901 REACTIVE AIRWAY DISEASE WITH ACUTE EXACERBATION, UNSPECIFIED ASTHMA SEVERITY, UNSPECIFIED WHETHER PERSISTENT: ICD-10-CM

## 2020-11-04 PROCEDURE — 99214 OFFICE O/P EST MOD 30 MIN: CPT | Performed by: INTERNAL MEDICINE

## 2020-11-04 NOTE — TELEPHONE ENCOUNTER
From: Rodrigo Peña  To: Reuben Headley MD  Sent: 11/3/2020 10:33 PM EST  Subject: Referral Request    Good evening, I hope you are well. We have an appointment for tomorrow at 11:15 and I realized that allot of my health history issues pertaining to when I had asthma attacks etc, I visited [facility]. I reached out to them and was able to pickup and scan my medical records. It's allot and won't fit in this one email, so I am going to email you a few time lol. I'm sorry about that.

## 2020-11-04 NOTE — PROGRESS NOTES
Send link to: [phone]    Pneumococcal vaccine: has not received     Flu vaccine: has not received     Sent medical history from [facility] though messenger

## 2020-11-04 NOTE — PROGRESS NOTES
Anthony Olson was seen on 11/4/2020 using synchronous (real-time) audio-video technology; doxy. me. Consent: Anthony Olson, who was seen by synchronous (real-time) audio-video technology, and/or her healthcare decision maker, is aware that this patient-initiated, Telehealth encounter on 11/4/2020 is a billable service, with coverage as determined by her insurance carrier. She is aware that she may receive a bill and has provided verbal consent to proceed: Yes. I was in the office while conducting this encounter. Anthony Olson is a 45 y.o. female who presents today for Asthma  . She has a history of   Patient Active Problem List   Diagnosis Code    Hx of ectopic pregnancy Z87.59    Encounter for repeat Papanicolaou smear of cervix due to previous unsatisfactory results R87.615    Asthma J45.909   . Today patient is being seen for follow up. RAD: Patient with a history of asthma. Patient does have well-documented recurrent asthma exacerbations. Most recent exacerbation was in April. 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 In [facility]  Current plan is to be back in school in November and students return in Feb. 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 Patient also does have significant allergies. She has provided us with the forms to ask for an exception that she remain virtual.  She also does live with her father who has chronic conditions. Her children also do have reactive airways disease. R wrist cyst. Has gotten progressively worse. Been present since 2016. ROS  Review of Systems   Constitutional: Negative for chills, fever, malaise/fatigue and weight loss. HENT: Negative for congestion and sore throat. Eyes: Negative for blurred vision, double vision and photophobia. Respiratory: Negative for cough and shortness of breath. Cardiovascular: Negative for chest pain, palpitations, orthopnea, claudication and leg swelling.    Gastrointestinal: Negative for abdominal pain, constipation, diarrhea, heartburn, nausea and vomiting. Genitourinary: Negative for dysuria, frequency, hematuria and urgency. Musculoskeletal: Positive for joint pain. Negative for back pain, myalgias and neck pain. Wrist pain   Skin: Negative for rash. Neurological: Negative. Negative for headaches. Endo/Heme/Allergies: Does not bruise/bleed easily. Psychiatric/Behavioral: Negative for depression, memory loss and suicidal ideas. The patient is nervous/anxious. There were no vitals taken for this visit. Patient-Reported Vitals 11/4/2020   Patient-Reported Weight 172 lb        Physical Exam  Constitutional:       Appearance: Normal appearance. HENT:      Head: Normocephalic and atraumatic. Pulmonary:      Effort: Pulmonary effort is normal.   Neurological:      General: No focal deficit present. Mental Status: She is alert. Psychiatric:         Mood and Affect: Mood normal.         Behavior: Behavior normal.           Current Outpatient Medications   Medication Sig    montelukast (SINGULAIR) 10 mg tablet TAKE ONE TABLET BY MOUTH ONE TIME DAILY    azelastine (ASTELIN) 137 mcg (0.1 %) nasal spray USE 1 SPRAY BY BOTH NOSTRILS ROUTE TWO (2) TIMES A DAY. USE IN EACH NOSTRIL AS DIRECTED    cetirizine (ZyrTEC) 10 mg tablet Take  by mouth.  fluticasone propionate (Flonase Allergy Relief) 50 mcg/actuation nasal spray 2 Sprays by Both Nostrils route daily.  norethindrone-ethinyl estradiol (LOESTRIN 1/20, 21,) 1-20 mg-mcg tablet Take 1 Tab by mouth daily.  MULTIVITAMIN PO Take  by mouth.  albuterol (PROAIR HFA) 90 mcg/actuation inhaler Take  by inhalation.  cholecalciferol (VITAMIN D3) 1,000 unit cap Take 5,000 Units by mouth daily. No current facility-administered medications for this visit.          Past Medical History:   Diagnosis Date    Abnormal Papanicolaou smear of cervix 2012    colpo with biopsy, expectant management last pap 2016 normal  Asthma     Bronchitis     Ectopic pregnancy 2007    Routine Papanicolaou smear 02/07/2020    Negative ; HVP Negative       Past Surgical History:   Procedure Laterality Date    HX GYN      ectopic pregnancy with fallopian tube removed      Social History     Tobacco Use    Smoking status: Never Smoker    Smokeless tobacco: Never Used   Substance Use Topics    Alcohol use: Yes     Alcohol/week: 1.7 standard drinks     Types: 1 Glasses of wine, 1 Shots of liquor per week     Comment: occasionally      Family History   Problem Relation Age of Onset    Colon Polyps Father     Breast Cancer Maternal Aunt     Colon Cancer Paternal Grandmother         colon CA    Arthritis-osteo Mother     Other Mother         palpitations    Alcohol abuse Paternal Grandfather     Heart Disease Paternal Grandfather     Alcohol abuse Maternal Grandfather     Asthma Neg Hx     Diabetes Neg Hx         Allergies   Allergen Reactions    Latex Rash    Other Medication Other (comments)     Ortho-tricycline (Birth Control) hives         Assessment/Plan  Diagnoses and all orders for this visit:    1. 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 Forms will be filled out for school. In addition her children do have reactive airways disease and her elderly father lives with them as well. 2. Ganglion cyst of dorsum of right wrist-try topical NSAIDs. If this does not improve I have provided her with hand orthopedics names. 3. Allergic rhinitis, unspecified seasonality, unspecified trigger    4. Reactive airway disease with acute exacerbation, unspecified asthma severity, unspecified whether persistent            Kennedy Mccain is a 45 y.o. female being evaluated by a video visit encounter for concerns as above. A caregiver was present when appropriate.  Due to this being a TeleHealth encounter (During PRXEJ-90 public health emergency), evaluation of the following organ systems was limited: Vitals/Constitutional/EENT/Resp/CV/GI//MS/Neuro/Skin/Heme-Lymph-Imm. Pursuant to the emergency declaration under the [facility] waiver authority and the [employer] Act, this Virtual  Visit was conducted, with patient's (and/or legal guardian's) consent, to reduce the patient's risk of exposure to COVID-19 and provide necessary medical care. Services were provided through a video synchronous discussion virtually to substitute for in-person clinic visit. Patient and provider were located at their individual homes. Prudence Krishnan MD  11/4/2020    This note was created with the help of speech recognition software [employer]) and may contain some 'sound alike' errors.

## 2020-11-04 NOTE — TELEPHONE ENCOUNTER
From: Teresa Hui  To: Jayce Mccloud MD  Sent: 11/3/2020 10:41 PM EST  Subject: Referral Request    Good evening, I hope you are well. We have an appointment for tomorrow at 11:15 and I realized that allot of my health history issues pertaining to when I had asthma attacks etc, I visited [facility]. I reached out to them and was able to pickup and scan my medical records. It's allot and won't fit in this one email, so I am going to email you a few time lol. I'm sorry about that. Here are more pages. ...

## 2020-11-04 NOTE — TELEPHONE ENCOUNTER
From: Ira Ochoa  To: Odalis Courtney MD  Sent: 11/3/2020 10:43 PM EST  Subject: Referral Request    Good evening, I hope you are well. We have an appointment for tomorrow at 11:15 and I realized that allot of my health history issues pertaining to when I had asthma attacks etc, I visited [facility]. I reached out to them and was able to pickup and scan my medical records. It's allot and won't fit in this one email, so I am going to email you a few time lol. I'm sorry about that. And even more pages. .. This is it.

## 2020-11-06 ENCOUNTER — TELEPHONE (OUTPATIENT)
Dept: INTERNAL MEDICINE CLINIC | Age: 40
End: 2020-11-06

## 2020-11-06 NOTE — TELEPHONE ENCOUNTER
Patient is calling to check status  on forms she needed completed for work , she can  Be reached at [phone]

## 2020-11-09 NOTE — TELEPHONE ENCOUNTER
Spoke with patient and advised her that her form has been faxed. Pt would like to come  the original form. Placed it at the screening table for pt to  at her convenience.

## 2020-12-29 RX ORDER — AZELASTINE 1 MG/ML
SPRAY, METERED NASAL
Qty: 1 BOTTLE | Refills: 1 | Status: SHIPPED | OUTPATIENT
Start: 2020-12-29 | End: 2021-08-08

## 2021-02-24 DIAGNOSIS — Z30.41 ENCOUNTER FOR SURVEILLANCE OF CONTRACEPTIVE PILLS: ICD-10-CM

## 2021-02-24 RX ORDER — NORETHINDRONE ACETATE AND ETHINYL ESTRADIOL 1; .02 MG/1; MG/1
1 TABLET ORAL DAILY
Qty: 1 PACKAGE | Refills: 0 | Status: SHIPPED | OUTPATIENT
Start: 2021-02-24 | End: 2021-04-15

## 2021-04-14 ENCOUNTER — TELEPHONE (OUTPATIENT)
Dept: OBGYN CLINIC | Age: 41
End: 2021-04-14

## 2021-04-14 DIAGNOSIS — Z30.41 ENCOUNTER FOR SURVEILLANCE OF CONTRACEPTIVE PILLS: ICD-10-CM

## 2021-04-14 NOTE — TELEPHONE ENCOUNTER
Patient had her last AE on 2/7/20. Was asked to call in to schedule her next annual when she requested refill of her ocp. She declines to come in. She said she does not want to come in with risk of Covid. I explained that we have not stopped doing exams, as they must go on. She wants to know if you would be willing to send in a short supply to allow her time to get \"vaccinated\". She is set for her first injection next Saturday. Advised that I would send you a message.       [facility]

## 2021-04-15 RX ORDER — NORETHINDRONE ACETATE AND ETHINYL ESTRADIOL 1; .02 MG/1; MG/1
1 TABLET ORAL DAILY
Qty: 1 PACKAGE | Refills: 1 | Status: SHIPPED | OUTPATIENT
Start: 2021-04-15 | End: 2021-06-04

## 2021-05-12 ENCOUNTER — TRANSCRIBE ORDER (OUTPATIENT)
Dept: SCHEDULING | Age: 41
End: 2021-05-12

## 2021-05-12 DIAGNOSIS — Z12.31 SCREENING MAMMOGRAM, ENCOUNTER FOR: Primary | ICD-10-CM

## 2021-06-04 DIAGNOSIS — Z30.41 ENCOUNTER FOR SURVEILLANCE OF CONTRACEPTIVE PILLS: ICD-10-CM

## 2021-06-04 RX ORDER — NORETHINDRONE ACETATE AND ETHINYL ESTRADIOL 1; .02 MG/1; MG/1
TABLET ORAL
Qty: 21 TABLET | Refills: 0 | Status: SHIPPED | OUTPATIENT
Start: 2021-06-04 | End: 2021-06-18 | Stop reason: SDUPTHER

## 2021-06-04 NOTE — TELEPHONE ENCOUNTER
45year old patient last seen in the office on 2/7/2020    Patient has next appointment on 6/18/2021    Prescription refill sent as per MD order to get patient to her scheduled appointment

## 2021-06-17 NOTE — PROGRESS NOTES
Annual exam ages 21-44    Henna Melendrez is a ,  45 y.o. female BLACK/ Patient's last menstrual period was 2021., who presents for her annual checkup. LV=20 for AE    Since her last annual GYN exam about one year ago, she has had the following changes in her health history:   - covid vaccines    ADDITIONAL CONCERNS:  She is having no significant problems. With regard to the Gardasil vaccine, she declines to receive it. Menstrual status:    Her periods are moderate in flow. She is using three to five pads or tampons per day, usually regular and last 26-30 days. She denies dysmenorrhea. She denies premenstrual symptoms. Contraception:    The current method of family planning is OCP (estrogen/progesterone). Sexual history:     She  reports being sexually active and has had partner(s) who are Male. She reports using the following method of birth control/protection: Pill. .        Pap and Mammogram History:    Her most recent Pap smear was normal, HPV was neg, obtained 2020. She does have a history of abnormal paps.   2012 colpo with biopsy, expectant management last pap 2020 normal    The patient has never had a mammogram.    Breast Cancer History/Substance Abuse:    Past Medical History:   Diagnosis Date    Abnormal Papanicolaou smear of cervix     colpo with biopsy, expectant management last pap  normal    Asthma     Bronchitis     Ectopic pregnancy     Routine Papanicolaou smear 2020    Negative ; HVP Negative      Past Surgical History:   Procedure Laterality Date    HX GYN      ectopic pregnancy with fallopian tube removed     OB History    Para Term  AB Living   2 1 1   1 1   SAB TAB Ectopic Molar Multiple Live Births   0 0 1   0 1      # Outcome Date GA Lbr Christiano/2nd Weight Sex Delivery Anes PTL Lv   2 Term 16 38w1d 07:43 / 05:49 6 lb 11.1 oz (3.035 kg) M VAGINAL DELI EPIDURAL AN N JOSEFA   1 Ectopic  Birth Comments: R ectopic - salpingectomy       Current Outpatient Medications   Medication Sig Dispense Refill    norethindrone-ethinyl estradiol (MICROGESTIN 1/20) 1-20 mg-mcg tablet TAKE 1 TABLET BY MOUth Daily for 21 days then 7 days off. 3 Package 4    azelastine (ASTELIN) 137 mcg (0.1 %) nasal spray USE 1 SPRAY IN EACH NOSTRIL TWICE A DAY 1 Bottle 1    albuterol (ProAir HFA) 90 mcg/actuation inhaler Take 2 Puffs by inhalation every four (4) hours as needed for Wheezing. 1 Inhaler 11    montelukast (SINGULAIR) 10 mg tablet TAKE ONE TABLET BY MOUTH ONE TIME DAILY 90 Tab 3    cetirizine (ZyrTEC) 10 mg tablet Take  by mouth.  fluticasone propionate (Flonase Allergy Relief) 50 mcg/actuation nasal spray 2 Sprays by Both Nostrils route daily.  MULTIVITAMIN PO Take  by mouth.  cholecalciferol (VITAMIN D3) 1,000 unit cap Take 5,000 Units by mouth daily. (Patient not taking: Reported on 6/18/2021)       Allergies: Latex and Other medication   Social History     Socioeconomic History    Marital status:      Spouse name: Not on file    Number of children: Not on file    Years of education: Not on file    Highest education level: Not on file   Occupational History    Not on file   Tobacco Use    Smoking status: Never Smoker    Smokeless tobacco: Never Used   Vaping Use    Vaping Use: Never used   Substance and Sexual Activity    Alcohol use:  Yes     Alcohol/week: 1.7 standard drinks     Types: 1 Glasses of wine, 1 Shots of liquor per week     Comment: occasionally    Drug use: No    Sexual activity: Yes     Partners: Male     Birth control/protection: Pill   Other Topics Concern    Not on file   Social History Narrative    Not on file     Social Determinants of Health     Financial Resource Strain:     Difficulty of Paying Living Expenses:    Food Insecurity:     Worried About Running Out of Food in the Last Year:     Shiv of Food in the Last Year:    Transportation Needs:     Lack of Transportation (Medical):  Lack of Transportation (Non-Medical):    Physical Activity:     Days of Exercise per Week:     Minutes of Exercise per Session:    Stress:     Feeling of Stress :    Social Connections:     Frequency of Communication with Friends and Family:     Frequency of Social Gatherings with Friends and Family:     Attends Shinto Services:     Active Member of Clubs or Organizations:     Attends Club or Organization Meetings:     Marital Status:    Intimate Partner Violence:     Fear of Current or Ex-Partner:     Emotionally Abused:     Physically Abused:     Sexually Abused: Tobacco History:  reports that she has never smoked. She has never used smokeless tobacco.  Alcohol Abuse:  reports current alcohol use of about 1.7 standard drinks of alcohol per week. Drug Abuse:  reports no history of drug use.     Patient Active Problem List   Diagnosis Code    Hx of ectopic pregnancy Z87.59    Encounter for repeat Papanicolaou smear of cervix due to previous unsatisfactory results R87.615    Asthma J45.909    Abnormal Papanicolaou smear of cervix R87.619     Family History   Problem Relation Age of Onset    Colon Polyps Father     Breast Cancer Maternal Aunt         ?dx'd in 35s -> 10+year survivor    Colon Cancer Paternal Grandmother         colon CA    Arthritis-osteo Mother     Other Mother         palpitations    Alcohol abuse Paternal Grandfather     Heart Disease Paternal Grandfather     Alcohol abuse Maternal Grandfather     Asthma Neg Hx     Diabetes Neg Hx        Review of Systems - History obtained from the patient  Constitutional: negative for weight loss, fever, night sweats  HEENT: negative for hearing loss, earache, congestion, snoring, sorethroat  CV: negative for chest pain, palpitations, edema  Resp: negative for cough, shortness of breath, wheezing  GI: negative for change in bowel habits, abdominal pain, black or bloody stools  : negative for frequency, dysuria, hematuria, vaginal discharge  MSK: negative for back pain, joint pain, muscle pain  Breast: negative for breast lumps, nipple discharge, galactorrhea  Skin :negative for itching, rash, hives  Neuro: negative for dizziness, headache, confusion, weakness  Psych: negative for anxiety, depression, change in mood  Heme/lymph: negative for bleeding, bruising, pallor    Physical Exam    Visit Vitals  /75   Pulse 85   Wt 167 lb (75.8 kg)   LMP 05/16/2021   BMI 27.79 kg/m²       Constitutional  · Appearance: well-nourished, well developed, alert, in no acute distress    HENT  · Head and Face: appears normal    Neck  · Inspection/Palpation: normal appearance, no masses or tenderness  · Lymph Nodes: no lymphadenopathy present  · Thyroid: gland size normal, nontender, no nodules or masses present on palpation    Chest  · Respiratory Effort: breathing unlabored  · Auscultation: normal breath sounds    Cardiovascular  · Heart:  · Auscultation: regular rate and rhythm without murmur    Breasts  · Inspection of Breasts: breasts symmetrical, no skin changes, no discharge present, nipple appearance normal, no skin retraction present  · Palpation of Breasts and Axillae: no masses present on palpation, no breast tenderness  · Axillary Lymph Nodes: no lymphadenopathy present    Gastrointestinal  · Abdominal Examination: abdomen non-tender to palpation, normal bowel sounds, no masses present  · Liver and spleen: no hepatomegaly present, spleen not palpable  · Hernias: no hernias identified    Genitourinary  · External Genitalia: normal appearance for age, no discharge present, no tenderness present, no inflammatory lesions present, no masses present, no atrophy present  · Vagina: normal vaginal vault without central or paravaginal defects, no discharge present, no inflammatory lesions present, no masses present; small amount menstrual blood  · Bladder: non-tender to palpation  · Urethra: appears normal  · Cervix: normal   · Uterus: normal size, shape and consistency  · Adnexa: no adnexal tenderness present, no adnexal masses present  · Perineum: perineum within normal limits, no evidence of trauma, no rashes or skin lesions present  · Anus: anus within normal limits, no hemorrhoids present  · Inguinal Lymph Nodes: no lymphadenopathy present    Skin  · General Inspection: no rash, no lesions identified    Neurologic/Psychiatric  · Mental Status:  · Orientation: grossly oriented to person, place and time  · Mood and Affect: mood normal, affect appropriate            Assessment & Plan:  · Routine gynecologic examination. Pap/HPV neg 2/7/20 -> rpt 2023  · H/o abnl paps, ~10yrs ago. Not sure of colpo results. Did not require tx. Nl paps since then. · Her current medical status is satisfactory with no evidence of significant gynecologic issues. · Cycles well controlled on Microgestin 1/20. 2057 [facility]3 RFx4. · Counseled re: diet, exercise, healthy lifestyle  · Return for yearly wellness visits  · Pt counseled regarding co-testing for high risk HPV with pap  · Has mammo scheduled  · FHx breast cancer: mat aunt, dx'd in late 35s?  10+year survivor  · Patient verbalized understanding

## 2021-06-18 ENCOUNTER — OFFICE VISIT (OUTPATIENT)
Dept: OBGYN CLINIC | Age: 41
End: 2021-06-18
Payer: COMMERCIAL

## 2021-06-18 ENCOUNTER — HOSPITAL ENCOUNTER (OUTPATIENT)
Dept: MAMMOGRAPHY | Age: 41
Discharge: HOME | End: 2021-06-18
Attending: INTERNAL MEDICINE
Payer: COMMERCIAL

## 2021-06-18 VITALS
WEIGHT: 167 LBS | SYSTOLIC BLOOD PRESSURE: 129 MMHG | HEART RATE: 85 BPM | BODY MASS INDEX: 27.79 KG/M2 | DIASTOLIC BLOOD PRESSURE: 75 MMHG

## 2021-06-18 DIAGNOSIS — Z30.41 ENCOUNTER FOR SURVEILLANCE OF CONTRACEPTIVE PILLS: ICD-10-CM

## 2021-06-18 DIAGNOSIS — Z12.31 SCREENING MAMMOGRAM, ENCOUNTER FOR: ICD-10-CM

## 2021-06-18 DIAGNOSIS — Z01.419 ENCOUNTER FOR GYNECOLOGICAL EXAMINATION: Primary | ICD-10-CM

## 2021-06-18 PROCEDURE — 99395 PREV VISIT EST AGE 18-39: CPT | Performed by: OBSTETRICS & GYNECOLOGY

## 2021-06-18 PROCEDURE — 77067 SCR MAMMO BI INCL CAD: CPT

## 2021-06-18 RX ORDER — NORETHINDRONE ACETATE AND ETHINYL ESTRADIOL 1; .02 MG/1; MG/1
TABLET ORAL
Qty: 3 PACKAGE | Refills: 4 | Status: SHIPPED | OUTPATIENT
Start: 2021-06-18 | End: 2022-06-22

## 2021-08-08 RX ORDER — AZELASTINE 1 MG/ML
SPRAY, METERED NASAL
Qty: 1 BOTTLE | Refills: 1 | Status: SHIPPED | OUTPATIENT
Start: 2021-08-08 | End: 2021-11-23

## 2021-11-12 DIAGNOSIS — J30.9 ALLERGIC RHINITIS, UNSPECIFIED SEASONALITY, UNSPECIFIED TRIGGER: ICD-10-CM

## 2021-11-12 RX ORDER — MONTELUKAST SODIUM 10 MG/1
TABLET ORAL
Qty: 90 TABLET | Refills: 3 | Status: SHIPPED | OUTPATIENT
Start: 2021-11-12

## 2021-11-15 ENCOUNTER — OFFICE VISIT (OUTPATIENT)
Dept: INTERNAL MEDICINE CLINIC | Age: 41
End: 2021-11-15
Payer: COMMERCIAL

## 2021-11-15 VITALS
OXYGEN SATURATION: 97 % | RESPIRATION RATE: 16 BRPM | TEMPERATURE: 98.1 F | HEIGHT: 65 IN | BODY MASS INDEX: 27.16 KG/M2 | HEART RATE: 79 BPM | SYSTOLIC BLOOD PRESSURE: 116 MMHG | DIASTOLIC BLOOD PRESSURE: 78 MMHG | WEIGHT: 163 LBS

## 2021-11-15 DIAGNOSIS — Z00.00 WELLNESS EXAMINATION: Primary | ICD-10-CM

## 2021-11-15 DIAGNOSIS — E78.5 HYPERLIPIDEMIA, UNSPECIFIED HYPERLIPIDEMIA TYPE: ICD-10-CM

## 2021-11-15 DIAGNOSIS — E55.9 VITAMIN D DEFICIENCY: ICD-10-CM

## 2021-11-15 DIAGNOSIS — R00.2 PALPITATIONS: ICD-10-CM

## 2021-11-15 PROCEDURE — 99396 PREV VISIT EST AGE 40-64: CPT | Performed by: INTERNAL MEDICINE

## 2021-11-15 PROCEDURE — 93000 ELECTROCARDIOGRAM COMPLETE: CPT | Performed by: INTERNAL MEDICINE

## 2021-11-15 NOTE — PROGRESS NOTES
Karolina Barroso is a 37 y.o. female who presents today for Complete Physical (RM19// pt presenting today for annual CPE; pt has some concerns of \"feeling heartbeat\" mother has some heart issues)  . She has a history of   Patient Active Problem List   Diagnosis Code    Hx of ectopic pregnancy Z87.59    Encounter for repeat Papanicolaou smear of cervix due to previous unsatisfactory results R87.615    Asthma J45.909    Abnormal Papanicolaou smear of cervix R87.619   . Today patient is here for complete physical exam.     RAD: Currently on Singulair and as needed albuterol. She also does take Zyrtec for allergies. Having some palpitations. Not using albuterol daily. Has been on a diet recently has been able to successfully lose some weight. Denies any chest pain or shortness of breath. Is exercising regularly. HLD: mild in the past     Health maintenance hx includes:  Exercise: moderately active.  Form of exercise: Starting working out. Caprice Gaitan  Diet: generally follows a low fat low cholesterol diet, nonsmoker, alcohol intake none  Social: In [address]. Still doing science for 7th and 8th grade, but still doing virtual. Hopes to stay virtual.   and child at home, and 2 step children. 5, 15, 12.      Screening:    Colon cancer screening:  N/A   Breast cancer screening: last mammogram 2021 and   was normal   Cervical cancer screening: last PAP/Pelvic exam: 2020   and was normal.   Osteoporosis screening:  Last BMD: N/A      Immunizations:     Immunization History   Administered Date(s) Administered    COVID-19, PFIZER, MRNA, LNP-S, PF, 30MCG/0.3ML DOSE 04/18/2021, 05/09/2021    Hepatitis B Vaccine 11/25/2011, 12/27/2011, 05/30/2012    Tdap 08/07/2014, 01/18/2016      Immunization status: up to date and documented, she will get her flu shot later this month. ROS  Review of Systems   Constitutional: Negative for chills, fever and weight loss. HENT: Negative for congestion and sore throat. Eyes: Negative for blurred vision, double vision and photophobia. Respiratory: Negative for cough and shortness of breath. Cardiovascular: Positive for palpitations. Negative for chest pain and leg swelling. Gastrointestinal: Negative for abdominal pain, constipation, diarrhea, heartburn, nausea and vomiting. Genitourinary: Negative for dysuria, frequency and urgency. Musculoskeletal: Negative for joint pain and myalgias. Skin: Negative for rash. Neurological: Negative. Negative for headaches. Endo/Heme/Allergies: Does not bruise/bleed easily. Psychiatric/Behavioral: Negative for memory loss and suicidal ideas. Visit Vitals  /78 (BP 1 Location: Left upper arm, BP Patient Position: Sitting, BP Cuff Size: Adult)   Pulse 79   Temp 98.1 °F (36.7 °C) (Oral)   Resp 16   Ht 5' 5\" (1.651 m)   Wt 163 lb (73.9 kg)   SpO2 97%   BMI 27.12 kg/m²       Physical Exam  Constitutional:       General: She is not in acute distress. Appearance: She is well-developed. HENT:      Head: Normocephalic and atraumatic. Neck:      Thyroid: No thyromegaly. Cardiovascular:      Rate and Rhythm: Normal rate and regular rhythm. Heart sounds: No murmur heard. Pulmonary:      Effort: Pulmonary effort is normal.      Breath sounds: Normal breath sounds. No wheezing. Abdominal:      General: Bowel sounds are normal. There is no distension. Palpations: Abdomen is soft. Musculoskeletal:      Cervical back: Normal range of motion and neck supple. Skin:     General: Skin is warm and dry. Neurological:      Mental Status: She is alert and oriented to person, place, and time. Cranial Nerves: No cranial nerve deficit.    Psychiatric:         Behavior: Behavior normal.           Current Outpatient Medications   Medication Sig    montelukast (SINGULAIR) 10 mg tablet TAKE ONE TABLET BY MOUTH ONE TIME DAILY    azelastine (ASTELIN) 137 mcg (0.1 %) nasal spray SPRAY 1 SPRAY INTO EACH NOSTRIL TWICE A DAY    norethindrone-ethinyl estradiol (MICROGESTIN 1/20) 1-20 mg-mcg tablet TAKE 1 TABLET BY MOUth Daily for 21 days then 7 days off.  albuterol (ProAir HFA) 90 mcg/actuation inhaler Take 2 Puffs by inhalation every four (4) hours as needed for Wheezing.  cetirizine (ZyrTEC) 10 mg tablet Take  by mouth.  fluticasone propionate (Flonase Allergy Relief) 50 mcg/actuation nasal spray 2 Sprays by Both Nostrils route daily.  MULTIVITAMIN PO Take  by mouth.  cholecalciferol (VITAMIN D3) 1,000 unit cap Take 5,000 Units by mouth daily. (Patient not taking: Reported on 11/15/2021)     No current facility-administered medications for this visit. Past Medical History:   Diagnosis Date    Abnormal Papanicolaou smear of cervix 2012    colpo with biopsy, expectant management last pap 2016 normal    Asthma     Bronchitis     Ectopic pregnancy 2007    Routine Papanicolaou smear 02/07/2020    Negative ; HVP Negative       Past Surgical History:   Procedure Laterality Date    HX GYN      ectopic pregnancy with fallopian tube removed      Social History     Tobacco Use    Smoking status: Never Smoker    Smokeless tobacco: Never Used   Substance Use Topics    Alcohol use:  Yes     Alcohol/week: 1.7 standard drinks     Types: 1 Glasses of wine, 1 Shots of liquor per week     Comment: occasionally      Family History   Problem Relation Age of Onset    Colon Polyps Father     Breast Cancer Maternal Aunt         ?dx'd in 35s -> 10+year survivor    Colon Cancer Paternal Grandmother         colon CA    Arthritis-osteo Mother     Other Mother         palpitations    Alcohol abuse Paternal Grandfather     Heart Disease Paternal Grandfather     Alcohol abuse Maternal Grandfather     Asthma Neg Hx     Diabetes Neg Hx         Allergies   Allergen Reactions    Latex Rash    Other Medication Other (comments)     Ortho-tricycline (Birth Control) hives         Assessment/Plan  Diagnoses and all orders for this visit:    1. Wellness examination- Aleisha Cortez was counseled on age-appropriate/ guideline-based risk prevention behaviors and screening for a 37y.o. year old   female . We also discussed adjustments in screening based on family history if necessary. Printed instructions for preventative screening guidelines and healthy behaviors given to patient with after visit summary.    -     LIPID PANEL; Future  -     METABOLIC PANEL, COMPREHENSIVE; Future  -     CBC WITH AUTOMATED DIFF; Future  -     VITAMIN D, 25 HYDROXY; Future  -     AMB POC EKG ROUTINE W/ 12 LEADS, INTER & REP    2. Vitamin D deficiency  -     VITAMIN D, 25 HYDROXY; Future    3. Hyperlipidemia, unspecified hyperlipidemia type-has successfully lost some weight recently. -     LIPID PANEL; Future  -     METABOLIC PANEL, COMPREHENSIVE; Future  -     CBC WITH AUTOMATED DIFF; Future    4. Palpitations-more recently. Denies any other concerning cardiac symptoms. EKG unchanged today compared to 2018. We will check blood work and thyroid studies. Low threshold for cardiac monitor if this persists.  -     TSH 3RD GENERATION; Future            Lily Toussaint MD  11/15/2021    This note was created with the help of speech recognition software [employer] Stephan[employer]) and may contain some 'sound alike' errors.

## 2021-11-15 NOTE — PROGRESS NOTES
Bruno Moore  Identified pt with two pt identifiers(name and ). Chief Complaint   Patient presents with    Complete Physical     RM19// pt presenting today for annual CPE; pt has some concerns of \"feeling heartbeat\" mother has some heart issues       1. Have you been to the ER, urgent care clinic since your last visit? Hospitalized since your last visit? NO    2. Have you seen or consulted any other health care providers outside of the [address] [place] [address] since your last visit? Include any pap smears or colon screening. NO      Provider notified of reason for visit, vitals and flowsheets obtained on patients.      Patient received paperwork for advance directive during previous visit but has not completed at this time     Reviewed record In preparation for visit, huddled with provider and have obtained necessary documentation      Health Maintenance Due   Topic    Flu Vaccine (1)       Wt Readings from Last 3 Encounters:   11/15/21 163 lb (73.9 kg)   21 167 lb (75.8 kg)   20 172 lb (78 kg)     Temp Readings from Last 3 Encounters:   11/15/21 98.1 °F (36.7 °C) (Oral)   20 97.7 °F (36.5 °C) (Oral)   19 98.3 °F (36.8 °C) (Oral)     BP Readings from Last 3 Encounters:   11/15/21 116/78   21 129/75   20 122/86     Pulse Readings from Last 3 Encounters:   11/15/21 79   21 85   19 83     Vitals:    11/15/21 0835   BP: 116/78   Pulse: 79   Resp: 16   Temp: 98.1 °F (36.7 °C)   TempSrc: Oral   SpO2: 97%   Weight: 163 lb (73.9 kg)   Height: 5' 5\" (1.651 m)   PainSc:   0 - No pain   LMP: 11/15/2021         Learning Assessment:  :     Learning Assessment 2016   PRIMARY LEARNER Patient -   HIGHEST LEVEL OF EDUCATION - PRIMARY LEARNER  - 4 YEARS OF COLLEGE   PRIMARY LANGUAGE ENGLISH -   LEARNER PREFERENCE PRIMARY READING OTHER (COMMENT)   ANSWERED BY maleka -   RELATIONSHIP SELF -       Depression Screening:  :     3 most recent PHQ Screens 11/15/2021 Little interest or pleasure in doing things Not at all   Feeling down, depressed, irritable, or hopeless Not at all   Total Score PHQ 2 0       Fall Risk Assessment:  :     Fall Risk Assessment, last 12 mths 11/4/2020   Able to walk? Yes   Fall in past 12 months? No       Abuse Screening:  :     Abuse Screening Questionnaire 11/4/2020 4/21/2020 11/26/2019 6/28/2019 8/24/2018 8/9/2017   Do you ever feel afraid of your partner? N N N N N N   Are you in a relationship with someone who physically or mentally threatens you? N N N N N N   Is it safe for you to go home? Y Y Y Y Y Y       ADL Screening:  :     No flowsheet data found. Medication reconciliation up to date and corrected with patient at this time.

## 2021-11-15 NOTE — PATIENT INSTRUCTIONS
Well Visit, Ages 25 to 48: Care Instructions  Overview     Well visits can help you stay healthy. Your doctor has checked your overall health and may have suggested ways to take good care of yourself. Your doctor also may have recommended tests. At home, you can help prevent illness with healthy eating, regular exercise, and other steps. Follow-up care is a key part of your treatment and safety. Be sure to make and go to all appointments, and call your doctor if you are having problems. It's also a good idea to know your test results and keep a list of the medicines you take. How can you care for yourself at home? · Get screening tests that you and your doctor decide on. Screening helps find diseases before any symptoms appear. · Eat healthy foods. Choose fruits, vegetables, whole grains, protein, and low-fat dairy foods. Limit fat, especially saturated fat. Reduce salt in your diet. · Limit alcohol. If you are a man, have no more than 2 drinks a day or 14 drinks a week. If you are a woman, have no more than 1 drink a day or 7 drinks a week. · Get at least 30 minutes of physical activity on most days of the week. Walking is a good choice. You also may want to do other activities, such as running, swimming, cycling, or playing tennis or team sports. Discuss any changes in your exercise program with your doctor. · Reach and stay at a healthy weight. This will lower your risk for many problems, such as obesity, diabetes, heart disease, and high blood pressure. · Do not smoke or allow others to smoke around you. If you need help quitting, talk to your doctor about stop-smoking programs and medicines. These can increase your chances of quitting for good. · Care for your mental health. It is easy to get weighed down by worry and stress. Learn strategies to manage stress, like deep breathing and mindfulness, and stay connected with your family and community.  If you find you often feel sad or hopeless, talk with your doctor. Treatment can help. · Talk to your doctor about whether you have any risk factors for sexually transmitted infections (STIs). You can help prevent STIs if you wait to have sex with a new partner (or partners) until you've each been tested for STIs. It also helps if you use condoms (male or female condoms) and if you limit your sex partners to one person who only has sex with you. Vaccines are available for some STIs, such as HPV. · Use birth control if it's important to you to prevent pregnancy. Talk with your doctor about the choices available and what might be best for you. · If you think you may have a problem with alcohol or drug use, talk to your doctor. This includes prescription medicines (such as amphetamines and opioids) and illegal drugs (such as cocaine and methamphetamine). Your doctor can help you figure out what type of treatment is best for you. · Protect your skin from too much sun. When you're outdoors from 10 a.m. to 4 p.m., stay in the shade or cover up with clothing and a hat with a wide brim. Wear sunglasses that block UV rays. Even when it's cloudy, put broad-spectrum sunscreen (SPF 30 or higher) on any exposed skin. · See a dentist one or two times a year for checkups and to have your teeth cleaned. · Wear a seat belt in the car. When should you call for help? Watch closely for changes in your health, and be sure to contact your doctor if you have any problems or symptoms that concern you. Where can you learn more? Go to http://www.[employer].com/  Enter P072 in the search box to learn more about \"Well Visit, Ages 25 to 48: Care Instructions. \"  Current as of: February 11, 2021               Content Version: 13.0  © 0525-5435 Healthwise, Incorporated. Care instructions adapted under license by [employer] (which disclaims liability or warranty for this information).  If you have questions about a medical condition or this instruction, always ask your healthcare professional. [place] [address] any warranty or liability for your use of this information.     azalia

## 2021-11-16 LAB
25(OH)D3+25(OH)D2 SERPL-MCNC: 57.9 NG/ML (ref 30–100)
ALBUMIN SERPL-MCNC: 3.9 G/DL (ref 3.8–4.8)
ALBUMIN/GLOB SERPL: 1.3 {RATIO} (ref 1.2–2.2)
ALP SERPL-CCNC: 77 IU/L (ref 44–121)
ALT SERPL-CCNC: 17 IU/L (ref 0–32)
AST SERPL-CCNC: 16 IU/L (ref 0–40)
BASOPHILS # BLD AUTO: 0.1 X10E3/UL (ref 0–0.2)
BASOPHILS NFR BLD AUTO: 1 %
BILIRUB SERPL-MCNC: 0.4 MG/DL (ref 0–1.2)
BUN SERPL-MCNC: 15 MG/DL (ref 6–24)
BUN/CREAT SERPL: 18 (ref 9–23)
CALCIUM SERPL-MCNC: 9.4 MG/DL (ref 8.7–10.2)
CHLORIDE SERPL-SCNC: 102 MMOL/L (ref 96–106)
CHOLEST SERPL-MCNC: 173 MG/DL (ref 100–199)
CO2 SERPL-SCNC: 25 MMOL/L (ref 20–29)
CREAT SERPL-MCNC: 0.82 MG/DL (ref 0.57–1)
EOSINOPHIL # BLD AUTO: 0.3 X10E3/UL (ref 0–0.4)
EOSINOPHIL NFR BLD AUTO: 4 %
ERYTHROCYTE [DISTWIDTH] IN BLOOD BY AUTOMATED COUNT: 12 % (ref 11.7–15.4)
GLOBULIN SER CALC-MCNC: 3 G/DL (ref 1.5–4.5)
GLUCOSE SERPL-MCNC: 82 MG/DL (ref 65–99)
HCT VFR BLD AUTO: 39.8 % (ref 34–46.6)
HDLC SERPL-MCNC: 46 MG/DL
HGB BLD-MCNC: 13.3 G/DL (ref 11.1–15.9)
IMM GRANULOCYTES # BLD AUTO: 0 X10E3/UL (ref 0–0.1)
IMM GRANULOCYTES NFR BLD AUTO: 0 %
IMP & REVIEW OF LAB RESULTS: NORMAL
LDLC SERPL CALC-MCNC: 110 MG/DL (ref 0–99)
LYMPHOCYTES # BLD AUTO: 2.5 X10E3/UL (ref 0.7–3.1)
LYMPHOCYTES NFR BLD AUTO: 40 %
MCH RBC QN AUTO: 30.2 PG (ref 26.6–33)
MCHC RBC AUTO-ENTMCNC: 33.4 G/DL (ref 31.5–35.7)
MCV RBC AUTO: 90 FL (ref 79–97)
MONOCYTES # BLD AUTO: 0.3 X10E3/UL (ref 0.1–0.9)
MONOCYTES NFR BLD AUTO: 5 %
NEUTROPHILS # BLD AUTO: 3.1 X10E3/UL (ref 1.4–7)
NEUTROPHILS NFR BLD AUTO: 50 %
PLATELET # BLD AUTO: 366 X10E3/UL (ref 150–450)
POTASSIUM SERPL-SCNC: 4.2 MMOL/L (ref 3.5–5.2)
PROT SERPL-MCNC: 6.9 G/DL (ref 6–8.5)
RBC # BLD AUTO: 4.41 X10E6/UL (ref 3.77–5.28)
SODIUM SERPL-SCNC: 139 MMOL/L (ref 134–144)
TRIGL SERPL-MCNC: 91 MG/DL (ref 0–149)
TSH SERPL DL<=0.005 MIU/L-ACNC: 1.15 UIU/ML (ref 0.45–4.5)
VLDLC SERPL CALC-MCNC: 17 MG/DL (ref 5–40)
WBC # BLD AUTO: 6.3 X10E3/UL (ref 3.4–10.8)

## 2021-11-23 RX ORDER — AZELASTINE 1 MG/ML
SPRAY, METERED NASAL
Qty: 1 EACH | Refills: 1 | Status: SHIPPED | OUTPATIENT
Start: 2021-11-23 | End: 2022-06-19

## 2021-12-09 RX ORDER — ALBUTEROL SULFATE 90 UG/1
2 AEROSOL, METERED RESPIRATORY (INHALATION)
Qty: 1 EACH | Refills: 3 | Status: SHIPPED | OUTPATIENT
Start: 2021-12-09

## 2022-02-13 NOTE — TELEPHONE ENCOUNTER
eugenia message received for refill of OCP. Sent Rx in - will let pt know we'd like to see her for her annual before more refills are given. 0

## 2022-04-28 ENCOUNTER — VIRTUAL VISIT (OUTPATIENT)
Dept: INTERNAL MEDICINE CLINIC | Age: 42
End: 2022-04-28
Payer: COMMERCIAL

## 2022-04-28 DIAGNOSIS — J45.901 REACTIVE AIRWAY DISEASE WITH ACUTE EXACERBATION, UNSPECIFIED ASTHMA SEVERITY, UNSPECIFIED WHETHER PERSISTENT: ICD-10-CM

## 2022-04-28 DIAGNOSIS — U07.1 COVID-19: Primary | ICD-10-CM

## 2022-04-28 PROCEDURE — 99213 OFFICE O/P EST LOW 20 MIN: CPT | Performed by: INTERNAL MEDICINE

## 2022-04-28 RX ORDER — PREDNISONE 20 MG/1
20 TABLET ORAL
Qty: 10 TABLET | Refills: 0 | Status: SHIPPED | OUTPATIENT
Start: 2022-04-28 | End: 2022-05-03

## 2022-04-28 NOTE — PROGRESS NOTES
Sarabjit Vargas was seen on 4/28/2022 using synchronous (real-time) audio-video technology; doxy. me. Consent: Sarabjit Vargas, who was seen by synchronous (real-time) audio-video technology, and/or her healthcare decision maker, is aware that this patient-initiated, Telehealth encounter on 4/28/2022 is a billable service, with coverage as determined by her insurance carrier. She is aware that she may receive a bill and has provided verbal consent to proceed: Yes. I was in the office while conducting this encounter. Sarabjit Vargas is a 37 y.o. female who presents today for Concern For COVID-19 (Coronavirus) (Concerned due to her history of asthma, states she had tightness in her chest today. Patient tested positive for oOvid on Tuesday afternoon. Patient experiencing fatigue, coughing, congestion, states she had a really bad headache Monday and Tuesday night. Last night states she was sweating in her sleep. )  . She has a history of   Patient Active Problem List   Diagnosis Code    Hx of ectopic pregnancy Z87.59    Encounter for repeat Papanicolaou smear of cervix due to previous unsatisfactory results R87.615    Asthma J45.909    Abnormal Papanicolaou smear of cervix R87.619   . Today patient is being seen for an acute visit. she does not have other concerns. COVID-19:   Patient was diagnosed with COVID-19 on Tuesday afternoon. Patient reports that she has been having symptoms ever since Monday. Began feeling tired on Monday evening. Thought that this was allergies. On Tuesday continued to have congestion and fatigue. Having more chest tightness and congestion. Taking Aleeve D with some   Has used albuterol with some help. Discussed use of prednisone due to RAD. ROS  Review of Systems   Constitutional: Positive for chills, fever and malaise/fatigue. Negative for weight loss. HENT: Positive for congestion and sinus pain. Negative for sore throat.     Eyes: Negative for blurred vision, double vision and photophobia. Respiratory: Positive for cough and wheezing. Negative for shortness of breath. Cardiovascular: Negative for chest pain, palpitations and leg swelling. Gastrointestinal: Negative for abdominal pain, constipation, diarrhea, heartburn, nausea and vomiting. Genitourinary: Negative for dysuria, frequency and urgency. Musculoskeletal: Negative for joint pain and myalgias. Skin: Negative for rash. Neurological: Negative. Negative for headaches. Endo/Heme/Allergies: Does not bruise/bleed easily. Psychiatric/Behavioral: Negative for memory loss and suicidal ideas. There were no vitals taken for this visit. Patient-Reported Vitals 4/28/2022   Patient-Reported Weight 164lb   Patient-Reported Height -   Patient-Reported Temperature -   Patient-Reported LMP -        Physical Exam  Constitutional:       Appearance: Normal appearance. HENT:      Head: Normocephalic and atraumatic. Pulmonary:      Effort: Pulmonary effort is normal.      Comments: Speaking in full sentences  Neurological:      General: No focal deficit present. Mental Status: She is alert. Psychiatric:         Mood and Affect: Mood normal.         Behavior: Behavior normal.           Current Outpatient Medications   Medication Sig    predniSONE (DELTASONE) 20 mg tablet Take 20 mg by mouth daily (with breakfast) for 5 days.  albuterol (ProAir HFA) 90 mcg/actuation inhaler Take 2 Puffs by inhalation every four (4) hours as needed for Wheezing.  azelastine (ASTELIN) 137 mcg (0.1 %) nasal spray USE 1 SPRAY INTO EACH NOSTRIL TWICE A DAY    montelukast (SINGULAIR) 10 mg tablet TAKE ONE TABLET BY MOUTH ONE TIME DAILY    norethindrone-ethinyl estradiol (MICROGESTIN 1/20) 1-20 mg-mcg tablet TAKE 1 TABLET BY MOUth Daily for 21 days then 7 days off.  cetirizine (ZyrTEC) 10 mg tablet Take  by mouth.     fluticasone propionate (Flonase Allergy Relief) 50 mcg/actuation nasal spray 2 Sprays by Both Nostrils route daily.  MULTIVITAMIN PO Take  by mouth. No current facility-administered medications for this visit. Past Medical History:   Diagnosis Date    Abnormal Papanicolaou smear of cervix 2012    colpo with biopsy, expectant management last pap 2016 normal    Asthma     Bronchitis     Ectopic pregnancy 2007    Routine Papanicolaou smear 02/07/2020    Negative ; HVP Negative       Past Surgical History:   Procedure Laterality Date    HX GYN      ectopic pregnancy with fallopian tube removed      Social History     Tobacco Use    Smoking status: Never Smoker    Smokeless tobacco: Never Used   Substance Use Topics    Alcohol use: Yes     Alcohol/week: 1.7 standard drinks     Types: 1 Glasses of wine, 1 Shots of liquor per week     Comment: occasionally      Family History   Problem Relation Age of Onset    Colon Polyps Father     Breast Cancer Maternal Aunt         ?dx'd in 35s -> 10+year survivor    Colon Cancer Paternal Grandmother         colon CA    OSTEOARTHRITIS Mother     Other Mother         palpitations    Alcohol abuse Paternal Grandfather     Heart Disease Paternal Grandfather     Alcohol abuse Maternal Grandfather     Asthma Neg Hx     Diabetes Neg Hx         Allergies   Allergen Reactions    Latex Rash    Other Medication Other (comments)     Ortho-tricycline (Birth Control) hives         Assessment/Plan  Diagnoses and all orders for this visit:    1. COVID-19-given underlying reactive airways disease as well as chest tightness, would suggest taking prednisone for 5 days. Overall appears the patient may already be improving. Currently would suggest against antiviral therapy. Patient will let us know if anything changes. -     predniSONE (DELTASONE) 20 mg tablet; Take 20 mg by mouth daily (with breakfast) for 5 days.     2. Reactive airway disease with acute exacerbation, unspecified asthma severity, unspecified whether persistent Ashley Dowd, was evaluated through a synchronous (real-time) audio-video encounter. The patient (or guardian if applicable) is aware that this is a billable service, which includes applicable co-pays. This Virtual Visit was conducted with patient's (and/or legal guardian's) consent. The visit was conducted pursuant to the emergency declaration under the [address] [place] [address] and the [employer] and [employer] General Act. Patient identification was verified, and a caregiver was present when appropriate. The patient was located in a state where the provider was licensed to provide care. Robyn Molina MD  4/28/2022    This note was created with the help of speech recognition software [employer]) and may contain some 'sound alike' errors.

## 2022-05-24 ENCOUNTER — TRANSCRIBE ORDER (OUTPATIENT)
Dept: SCHEDULING | Age: 42
End: 2022-05-24

## 2022-05-24 DIAGNOSIS — Z12.31 SCREENING MAMMOGRAM FOR HIGH-RISK PATIENT: Primary | ICD-10-CM

## 2022-06-19 RX ORDER — AZELASTINE 1 MG/ML
SPRAY, METERED NASAL
Qty: 1 EACH | Refills: 1 | Status: SHIPPED | OUTPATIENT
Start: 2022-06-19

## 2022-06-22 DIAGNOSIS — Z30.41 ENCOUNTER FOR SURVEILLANCE OF CONTRACEPTIVE PILLS: ICD-10-CM

## 2022-06-22 RX ORDER — NORETHINDRONE ACETATE AND ETHINYL ESTRADIOL 1; .02 MG/1; MG/1
TABLET ORAL
Qty: 1 DOSE PACK | Refills: 0 | Status: SHIPPED | OUTPATIENT
Start: 2022-06-22 | End: 2022-06-27 | Stop reason: SDUPTHER

## 2022-06-22 NOTE — TELEPHONE ENCOUNTER
37year old patient last seen in the office on 6/18/2021 and has next appointment is 6/27/2022 for ae    Prescription refill sent as per MD order to get patient do her scheduled appointment

## 2022-06-24 NOTE — PROGRESS NOTES
Annual exam ages 40-58    [address] Lisa Hare is a ,  37 y.o. female BLACK/ Patient's last menstrual period was 2022 (approximate). , who presents for her annual checkup. LV=21 AE        Since her last annual GYN exam about one year ago,  she has the following changes in her health history:   - +Covid 2022. Still having low energy, had some hearlt palpitations (told this is a common sx)  - vacc & boosted    Had weight loss with [employer]. [employer] changed plan in December, doesn't like the new plan. ADDITIONAL CONCERNS:  She is having no significant problems. With regard to the Gardasil vaccine, she is older than the age for which it is FDA approved. Menstrual status:    Her periods are moderate in flow. She is using three to five pads or tampons per day, usually regular and last 26-30 days. She denies dysmenorrhea. She denies premenstrual symptoms. Contraception:    The current method of family planning is OCP (estrogen/progesterone). Sexual history:     reports being sexually active and has had partner(s) who are male. She reports using the following method of birth control/protection: Pill. Pap and Mammogram History:    Her most recent Pap smear was normal, HPV was negative, obtained 20. She does have a history of abnormal pap smears.  colpo with biopsy, expectant management last pap 2020 normal    The patient has a mammogram scheduled for 22.     Past Medical History:   Diagnosis Date    Abnormal Papanicolaou smear of cervix     colpo with biopsy, expectant management last pap  normal    Asthma     Bronchitis     Ectopic pregnancy     Routine Papanicolaou smear 2020    Negative ; HVP Negative      Past Surgical History:   Procedure Laterality Date    HX GYN      ectopic pregnancy with fallopian tube removed     OB History    Para Term  AB Living   2 1 1   1 1   SAB IAB Ectopic Molar Multiple Live Births 0 0 1   0 1      # Outcome Date GA Lbr Christiano/2nd Weight Sex Delivery Anes PTL Lv   2 Term 02/08/16 38w1d 07:43 / 05:49 6 lb 11.1 oz (3.035 kg) M VAGINAL DELI EPIDURAL AN N JOSEFA   1 Ectopic 2007              Birth Comments: R ectopic - salpingectomy       Current Outpatient Medications   Medication Sig Dispense Refill    norethindrone-ethinyl estradiol (Junel 1/20, 21,) 1-20 mg-mcg tablet TAKE 1 TABLET BY MOUTH DAILY FOR 21 DAYS THEN 7 DAYS OFF 1 Dose Pack 0    azelastine (ASTELIN) 137 mcg (0.1 %) nasal spray SPRAY 1 SPRAY INTO EACH NOSTRIL TWICE A DAY 1 Each 1    albuterol (ProAir HFA) 90 mcg/actuation inhaler Take 2 Puffs by inhalation every four (4) hours as needed for Wheezing. 1 Each 3    montelukast (SINGULAIR) 10 mg tablet TAKE ONE TABLET BY MOUTH ONE TIME DAILY 90 Tablet 3    cetirizine (ZyrTEC) 10 mg tablet Take  by mouth.  fluticasone propionate (Flonase Allergy Relief) 50 mcg/actuation nasal spray 2 Sprays by Both Nostrils route daily.  MULTIVITAMIN PO Take  by mouth. Allergies: Latex and Other medication   Social History     Socioeconomic History    Marital status:      Spouse name: Not on file    Number of children: Not on file    Years of education: Not on file    Highest education level: Not on file   Occupational History    Not on file   Tobacco Use    Smoking status: Never Smoker    Smokeless tobacco: Never Used   Vaping Use    Vaping Use: Never used   Substance and Sexual Activity    Alcohol use:  Yes     Alcohol/week: 1.7 standard drinks     Types: 1 Glasses of wine, 1 Shots of liquor per week     Comment: occasionally    Drug use: No    Sexual activity: Yes     Partners: Male     Birth control/protection: Pill   Other Topics Concern    Not on file   Social History Narrative    Not on file     Social Determinants of Health     Financial Resource Strain:     Difficulty of Paying Living Expenses: Not on file   Food Insecurity:     Worried About Running Out of Food in the Last Year: Not on file    Ran Out of Food in the Last Year: Not on file   Transportation Needs:     Lack of Transportation (Medical): Not on file    Lack of Transportation (Non-Medical): Not on file   Physical Activity:     Days of Exercise per Week: Not on file    Minutes of Exercise per Session: Not on file   Stress:     Feeling of Stress : Not on file   Social Connections:     Frequency of Communication with Friends and Family: Not on file    Frequency of Social Gatherings with Friends and Family: Not on file    Attends Voodoo Services: Not on file    Active Member of [address] [place] [address] [employer] or Organizations: Not on file    Attends Club or Organization Meetings: Not on file    Marital Status: Not on file   Intimate Partner Violence:     Fear of Current or Ex-Partner: Not on file    Emotionally Abused: Not on file    Physically Abused: Not on file    Sexually Abused: Not on file   Housing Stability:     Unable to Pay for Housing in the Last Year: Not on file    Number of [place] in the Last Year: Not on file    Unstable Housing in the Last Year: Not on file     Tobacco History:  reports that she has never smoked. She has never used smokeless tobacco.  Alcohol Abuse:  reports current alcohol use of about 1.7 standard drinks of alcohol per week. Drug Abuse:  reports no history of drug use.     Patient Active Problem List   Diagnosis Code    Hx of ectopic pregnancy Z87.59    Encounter for repeat Papanicolaou smear of cervix due to previous unsatisfactory results R87.615    Asthma J45.909    Abnormal Papanicolaou smear of cervix R87.619     Family History   Problem Relation Age of Onset    Colon Polyps Father     Breast Cancer Maternal Aunt         ?dx'd in 35s -> 10+year survivor    Colon Cancer Paternal Grandmother         colon CA    OSTEOARTHRITIS Mother     Other Mother         palpitations    Alcohol abuse Paternal Grandfather     Heart Disease Paternal Grandfather     Alcohol abuse Maternal Grandfather     Asthma Neg Hx     Diabetes Neg Hx        Review of Systems - History obtained from the patient  Constitutional: negative for weight loss, fever, night sweats  HEENT: negative for hearing loss, earache, congestion, snoring, sorethroat  CV: negative for chest pain, palpitations, edema  Resp: negative for cough, shortness of breath, wheezing  GI: negative for change in bowel habits, abdominal pain, black or bloody stools  : negative for frequency, dysuria, hematuria, vaginal discharge  MSK: negative for back pain, joint pain, muscle pain  Breast: negative for breast lumps, nipple discharge, galactorrhea  Skin :negative for itching, rash, hives  Neuro: negative for dizziness, headache, confusion, weakness  Psych: negative for anxiety, depression, change in mood  Heme/lymph: negative for bleeding, bruising, pallor    Physical Exam    Visit Vitals  /79   Wt 172 lb (78 kg)   LMP 05/29/2022 (Approximate)   Breastfeeding No   BMI 28.62 kg/m²       Constitutional  · Appearance: well-nourished, well developed, alert, in no acute distress    HENT  · Head and Face: appears normal    Neck  · Inspection/Palpation: normal appearance, no masses or tenderness  · Lymph Nodes: no lymphadenopathy present  · Thyroid: gland size normal, nontender, no nodules or masses present on palpation    Chest  · Respiratory Effort: breathing unlabored  · Auscultation: normal breath sounds    Cardiovascular  · Heart:  · Auscultation: regular rate and rhythm without murmur    Breasts  · Inspection of Breasts: breasts symmetrical, no skin changes, no discharge present, nipple appearance normal, no skin retraction present  · Palpation of Breasts and Axillae: no masses present on palpation, no breast tenderness  · Axillary Lymph Nodes: no lymphadenopathy present    Gastrointestinal  · Abdominal Examination: abdomen non-tender to palpation, normal bowel sounds, no masses present  · Liver and spleen: no hepatomegaly present, spleen not palpable  · Hernias: no hernias identified    Genitourinary  · External Genitalia: normal appearance for age, no discharge present, no tenderness present, no inflammatory lesions present, no masses present, no atrophy present  · Vagina: normal vaginal vault without central or paravaginal defects, no discharge present, no inflammatory lesions present, no masses present  · Bladder: non-tender to palpation  · Urethra: appears normal  · Cervix: normal   · Uterus: normal size, shape and consistency  · Adnexa: no adnexal tenderness present, no adnexal masses present  · Perineum: perineum within normal limits, no evidence of trauma, no rashes or skin lesions present  · Anus: anus within normal limits, no hemorrhoids present  · Inguinal Lymph Nodes: no lymphadenopathy present    Skin  · General Inspection: no rash, no lesions identified    Neurologic/Psychiatric  · Mental Status:  · Orientation: grossly oriented to person, place and time  · Mood and Affect: mood normal, affect appropriate        Assessment & Plan:  · Routine gynecologic examination. Pap/HPV neg 2/7/20 -> rpt 2023  · H/o abnl paps, ~10yrs ago. Not sure of colpo results, did not require tx, had expectant management, so suspect neg or low grade. Nl paps since then. Pap due next year. · Cycles well controlled on Microgestin 1/20. [facility] #3 RFx4. · Her current medical status is satisfactory with no evidence of significant gynecologic issues. · Counseled re: diet, exercise, healthy lifestyle  · Return for yearly wellness visits  · Rec annual mammogram .  Scheduled for 7/5/22. · FHx breast cancer: mat aunt, dx'd in late 35s?  10+year survivor  · Patient verbalized understanding         Orders Placed This Encounter    norethindrone-ethinyl estradiol (Junel 1/20, 21,) 1-20 mg-mcg tablet     Sig: TAKE 1 TABLET BY MOUTH DAILY FOR 21 DAYS THEN 7 DAYS OFF     Dispense:  3 Dose Pack     Refill:  4

## 2022-06-27 ENCOUNTER — OFFICE VISIT (OUTPATIENT)
Dept: OBGYN CLINIC | Age: 42
End: 2022-06-27
Payer: COMMERCIAL

## 2022-06-27 VITALS — BODY MASS INDEX: 28.62 KG/M2 | WEIGHT: 172 LBS | DIASTOLIC BLOOD PRESSURE: 79 MMHG | SYSTOLIC BLOOD PRESSURE: 134 MMHG

## 2022-06-27 DIAGNOSIS — Z01.419 ENCOUNTER FOR WELL WOMAN EXAM: Primary | ICD-10-CM

## 2022-06-27 DIAGNOSIS — Z30.41 ENCOUNTER FOR SURVEILLANCE OF CONTRACEPTIVE PILLS: ICD-10-CM

## 2022-06-27 PROCEDURE — 99396 PREV VISIT EST AGE 40-64: CPT | Performed by: OBSTETRICS & GYNECOLOGY

## 2022-06-27 RX ORDER — NORETHINDRONE ACETATE AND ETHINYL ESTRADIOL 1; .02 MG/1; MG/1
TABLET ORAL
Qty: 3 DOSE PACK | Refills: 4 | Status: SHIPPED | OUTPATIENT
Start: 2022-06-27

## 2022-06-27 NOTE — PATIENT INSTRUCTIONS
Well Visit, Ages 25 to 48: Care Instructions  Overview     Well visits can help you stay healthy. Your doctor has checked your overall health and may have suggested ways to take good care of yourself. Your doctor also may have recommended tests. At home, you can help prevent illness with healthy eating, regular exercise, and other steps. Follow-up care is a key part of your treatment and safety. Be sure to make and go to all appointments, and call your doctor if you are having problems. It's also a good idea to know your test results and keep a list of the medicines you take. How can you care for yourself at home? · Get screening tests that you and your doctor decide on. Screening helps find diseases before any symptoms appear. · Eat healthy foods. Choose fruits, vegetables, whole grains, protein, and low-fat dairy foods. Limit fat, especially saturated fat. Reduce salt in your diet. · Limit alcohol. If you are a man, have no more than 2 drinks a day or 14 drinks a week. If you are a woman, have no more than 1 drink a day or 7 drinks a week. · Get at least 30 minutes of physical activity on most days of the week. Walking is a good choice. You also may want to do other activities, such as running, swimming, cycling, or playing tennis or team sports. Discuss any changes in your exercise program with your doctor. · Reach and stay at a healthy weight. This will lower your risk for many problems, such as obesity, diabetes, heart disease, and high blood pressure. · Do not smoke or allow others to smoke around you. If you need help quitting, talk to your doctor about stop-smoking programs and medicines. These can increase your chances of quitting for good. · Care for your mental health. It is easy to get weighed down by worry and stress. Learn strategies to manage stress, like deep breathing and mindfulness, and stay connected with your family and community.  If you find you often feel sad or hopeless, talk with your doctor. Treatment can help. · Talk to your doctor about whether you have any risk factors for sexually transmitted infections (STIs). You can help prevent STIs if you wait to have sex with a new partner (or partners) until you've each been tested for STIs. It also helps if you use condoms (male or female condoms) and if you limit your sex partners to one person who only has sex with you. Vaccines are available for some STIs, such as HPV. · Use birth control if it's important to you to prevent pregnancy. Talk with your doctor about the choices available and what might be best for you. · If you think you may have a problem with alcohol or drug use, talk to your doctor. This includes prescription medicines (such as amphetamines and opioids) and illegal drugs (such as cocaine and methamphetamine). Your doctor can help you figure out what type of treatment is best for you. · Protect your skin from too much sun. When you're outdoors from 10 a.m. to 4 p.m., stay in the shade or cover up with clothing and a hat with a wide brim. Wear sunglasses that block UV rays. Even when it's cloudy, put broad-spectrum sunscreen (SPF 30 or higher) on any exposed skin. · See a dentist one or two times a year for checkups and to have your teeth cleaned. · Wear a seat belt in the car. When should you call for help? Watch closely for changes in your health, and be sure to contact your doctor if you have any problems or symptoms that concern you. Where can you learn more? Go to http://www.[employer].com.com/  Enter P072 in the search box to learn more about \"Well Visit, Ages 25 to 48: Care Instructions. \"  Current as of: October 6, 2021               Content Version: 13.2  © 2867-5114 [employer], [employer]. Care instructions adapted under license by [employer] (which disclaims liability or warranty for this information).  If you have questions about a medical condition or this instruction, always ask your healthcare professional. [place] [address] any warranty or liability for your use of this information.

## 2022-07-05 ENCOUNTER — HOSPITAL ENCOUNTER (OUTPATIENT)
Dept: MAMMOGRAPHY | Age: 42
Discharge: HOME | End: 2022-07-05
Attending: OBSTETRICS & GYNECOLOGY
Payer: COMMERCIAL

## 2022-07-05 DIAGNOSIS — Z12.31 SCREENING MAMMOGRAM FOR HIGH-RISK PATIENT: ICD-10-CM

## 2022-07-05 PROCEDURE — 77067 SCR MAMMO BI INCL CAD: CPT

## 2022-12-21 DIAGNOSIS — J30.9 ALLERGIC RHINITIS, UNSPECIFIED SEASONALITY, UNSPECIFIED TRIGGER: ICD-10-CM

## 2022-12-21 RX ORDER — MONTELUKAST SODIUM 10 MG/1
TABLET ORAL
Qty: 90 TABLET | Refills: 3 | Status: SHIPPED | OUTPATIENT
Start: 2022-12-21

## 2023-01-25 RX ORDER — AZELASTINE 1 MG/ML
SPRAY, METERED NASAL
Qty: 30 EACH | Refills: 1 | Status: SHIPPED | OUTPATIENT
Start: 2023-01-25

## 2023-05-17 RX ORDER — AZELASTINE 1 MG/ML
SPRAY, METERED NASAL
COMMUNITY
Start: 2023-01-25

## 2023-05-17 RX ORDER — ALBUTEROL SULFATE 90 UG/1
2 AEROSOL, METERED RESPIRATORY (INHALATION) EVERY 4 HOURS PRN
COMMUNITY
Start: 2021-12-09

## 2023-05-17 RX ORDER — CETIRIZINE HYDROCHLORIDE 10 MG/1
TABLET ORAL
COMMUNITY

## 2023-05-17 RX ORDER — FLUTICASONE PROPIONATE 50 MCG
2 SPRAY, SUSPENSION (ML) NASAL DAILY
COMMUNITY

## 2023-05-17 RX ORDER — NORETHINDRONE ACETATE AND ETHINYL ESTRADIOL 1; .02 MG/1; MG/1
TABLET ORAL
COMMUNITY
Start: 2022-06-27 | End: 2023-07-10 | Stop reason: SDUPTHER

## 2023-05-17 RX ORDER — MONTELUKAST SODIUM 10 MG/1
1 TABLET ORAL DAILY
COMMUNITY
Start: 2022-12-21

## 2023-07-10 ENCOUNTER — OFFICE VISIT (OUTPATIENT)
Age: 43
End: 2023-07-10
Payer: COMMERCIAL

## 2023-07-10 ENCOUNTER — HOSPITAL ENCOUNTER (OUTPATIENT)
Facility: HOSPITAL | Age: 43
Discharge: HOME | End: 2023-07-13
Attending: OBSTETRICS & GYNECOLOGY
Payer: COMMERCIAL

## 2023-07-10 VITALS
BODY MASS INDEX: 27.62 KG/M2 | SYSTOLIC BLOOD PRESSURE: 149 MMHG | DIASTOLIC BLOOD PRESSURE: 92 MMHG | WEIGHT: 166 LBS | HEART RATE: 73 BPM

## 2023-07-10 DIAGNOSIS — Z12.31 OTHER SCREENING MAMMOGRAM: ICD-10-CM

## 2023-07-10 DIAGNOSIS — Z12.4 SCREENING FOR CERVICAL CANCER: ICD-10-CM

## 2023-07-10 DIAGNOSIS — Z01.419 ENCOUNTER FOR WELL WOMAN EXAM: Primary | ICD-10-CM

## 2023-07-10 PROCEDURE — 77067 SCR MAMMO BI INCL CAD: CPT

## 2023-07-10 PROCEDURE — 99396 PREV VISIT EST AGE 40-64: CPT | Performed by: OBSTETRICS & GYNECOLOGY

## 2023-07-10 RX ORDER — NORETHINDRONE ACETATE AND ETHINYL ESTRADIOL 1; .02 MG/1; MG/1
TABLET ORAL
Qty: 3 PACKET | Refills: 0 | Status: SHIPPED | OUTPATIENT
Start: 2023-07-10

## 2023-07-10 NOTE — PROGRESS NOTES
Luis Castillo is a 40 y.o. female returns for an annual exam     Chief Complaint   Patient presents with    Annual Exam       Patient's last menstrual period was 06/16/2023. Her periods are moderate in flow and usually regular with a 26-32 day interval with 3-7 day duration. She does not have dysmenorrhea. Problems: no problems  Birth Control: OCP (estrogen/progesterone). Last Pap: normal obtained 2/2020. She does have a history of abnormal pap smears   2012 colpo with biopsy, expectant management last pap 2016 normal    Last Mammogram:  had one today in main radiology .
normal size, shape and consistency  Adnexa: no adnexal tenderness present, no adnexal masses present  Perineum: perineum within normal limits, no evidence of trauma, no rashes or skin lesions present  Anus: anus within normal limits, no hemorrhoids present  Inguinal Lymph Nodes: no lymphadenopathy present    Skin  General Inspection: no rash, no lesions identified    Neurologic/Psychiatric  Mental Status:  Orientation: grossly oriented to person, place and time  Mood and Affect: mood normal, affect appropriate    Assessment & Plan:  Routine gynecologic examination. Pap/HPV today  H/o abnl pap 2012 -> cotest today as above  Her current medical status is satisfactory with no evidence of significant gynecologic issues. BP elevated in office today. Had 2 espresso and [place] this AM.  Has appt next month with PCP, Dr. Chelsey Ventura, will be rechecked then. Suggest she get BP for home and monitor until that appt. Cycles well controlled on OCPs. BP elevated as as above. [address] sent for #3 with no RF. Discussed if BP remains elevated, then rec progestin only method. Pamphlets given on [place]. If BP nl at PCP, let me know and can RF current OCP for the remainder of the year. Counseled re: diet, exercise, healthy lifestyle  Return for yearly wellness visits  Pt counseled regarding co-testing for high risk HPV with pap  Rec screening mammo. Done today in radiology  Patient verbalized understanding        Orders Placed This Encounter    PAP IG, Aptima HPV and rfx 16/18,45 ([id number])     Standing Status:   Future     Number of Occurrences:   1     Standing Expiration Date:   7/10/2024     Order Specific Question:   Pap Source? (Required)     Answer:   CERVIX     Order Specific Question:   Pap Source? (Required)     Answer:   ENDOCERVIX     Order Specific Question:   Pap collection method? (Required     Answer:   brush     Order Specific Question:   Pap collection method?           (Required

## 2023-07-16 LAB
CYTOLOGIST CVX/VAG CYTO: ABNORMAL
CYTOLOGY CVX/VAG DOC CYTO: ABNORMAL
CYTOLOGY CVX/VAG DOC THIN PREP: ABNORMAL
DX ICD CODE: ABNORMAL
DX ICD CODE: ABNORMAL
HPV GENOTYPE REFLEX: ABNORMAL
HPV I/H RISK 4 DNA CVX QL PROBE+SIG AMP: NEGATIVE
Lab: ABNORMAL
OTHER STN SPEC: ABNORMAL
PATHOLOGIST CVX/VAG CYTO: ABNORMAL
STAT OF ADQ CVX/VAG CYTO-IMP: ABNORMAL

## 2023-08-03 RX ORDER — NORETHINDRONE ACETATE AND ETHINYL ESTRADIOL 1; .02 MG/1; MG/1
TABLET ORAL
Qty: 21 TABLET | OUTPATIENT
Start: 2023-08-03

## 2023-08-10 ENCOUNTER — OFFICE VISIT (OUTPATIENT)
Age: 43
End: 2023-08-10
Payer: COMMERCIAL

## 2023-08-10 ENCOUNTER — PATIENT MESSAGE (OUTPATIENT)
Age: 43
End: 2023-08-10

## 2023-08-10 VITALS
OXYGEN SATURATION: 99 % | SYSTOLIC BLOOD PRESSURE: 124 MMHG | HEART RATE: 85 BPM | DIASTOLIC BLOOD PRESSURE: 85 MMHG | WEIGHT: 168.4 LBS | RESPIRATION RATE: 14 BRPM | TEMPERATURE: 98.4 F | HEIGHT: 65 IN | BODY MASS INDEX: 28.06 KG/M2

## 2023-08-10 DIAGNOSIS — E78.5 HYPERLIPIDEMIA, UNSPECIFIED HYPERLIPIDEMIA TYPE: ICD-10-CM

## 2023-08-10 DIAGNOSIS — J45.901 ASTHMA WITH ACUTE EXACERBATION, UNSPECIFIED ASTHMA SEVERITY, UNSPECIFIED WHETHER PERSISTENT: ICD-10-CM

## 2023-08-10 DIAGNOSIS — R03.0 ELEVATED BP WITHOUT DIAGNOSIS OF HYPERTENSION: ICD-10-CM

## 2023-08-10 DIAGNOSIS — Z00.00 WELLNESS EXAMINATION: Primary | ICD-10-CM

## 2023-08-10 PROCEDURE — 99396 PREV VISIT EST AGE 40-64: CPT | Performed by: INTERNAL MEDICINE

## 2023-08-10 RX ORDER — ACETAMINOPHEN 160 MG
TABLET,DISINTEGRATING ORAL
COMMUNITY

## 2023-08-10 RX ORDER — ALBUTEROL SULFATE 90 UG/1
2 AEROSOL, METERED RESPIRATORY (INHALATION) EVERY 4 HOURS PRN
Qty: 18 G | Refills: 2 | Status: SHIPPED | OUTPATIENT
Start: 2023-08-10

## 2023-08-10 SDOH — ECONOMIC STABILITY: FOOD INSECURITY: WITHIN THE PAST 12 MONTHS, YOU WORRIED THAT YOUR FOOD WOULD RUN OUT BEFORE YOU GOT MONEY TO BUY MORE.: NEVER TRUE

## 2023-08-10 SDOH — ECONOMIC STABILITY: HOUSING INSECURITY
IN THE LAST 12 MONTHS, WAS THERE A TIME WHEN YOU DID NOT HAVE A STEADY PLACE TO SLEEP OR SLEPT IN A SHELTER (INCLUDING NOW)?: NO

## 2023-08-10 SDOH — ECONOMIC STABILITY: FOOD INSECURITY: WITHIN THE PAST 12 MONTHS, THE FOOD YOU BOUGHT JUST DIDN'T LAST AND YOU DIDN'T HAVE MONEY TO GET MORE.: NEVER TRUE

## 2023-08-10 SDOH — ECONOMIC STABILITY: INCOME INSECURITY: HOW HARD IS IT FOR YOU TO PAY FOR THE VERY BASICS LIKE FOOD, HOUSING, MEDICAL CARE, AND HEATING?: NOT HARD AT ALL

## 2023-08-10 ASSESSMENT — PATIENT HEALTH QUESTIONNAIRE - PHQ9
SUM OF ALL RESPONSES TO PHQ QUESTIONS 1-9: 0
SUM OF ALL RESPONSES TO PHQ QUESTIONS 1-9: 0
SUM OF ALL RESPONSES TO PHQ9 QUESTIONS 1 & 2: 0
1. LITTLE INTEREST OR PLEASURE IN DOING THINGS: 0
2. FEELING DOWN, DEPRESSED OR HOPELESS: 0
SUM OF ALL RESPONSES TO PHQ QUESTIONS 1-9: 0
SUM OF ALL RESPONSES TO PHQ QUESTIONS 1-9: 0

## 2023-08-10 ASSESSMENT — ENCOUNTER SYMPTOMS
CHEST TIGHTNESS: 0
CONSTIPATION: 0
SHORTNESS OF BREATH: 0
ABDOMINAL PAIN: 0
BACK PAIN: 0
DIARRHEA: 0

## 2023-08-10 NOTE — PROGRESS NOTES
Nicolasa Lynn is a 40 y.o. female who presents today for Annual Exam  .      She has a history of   Patient Active Problem List   Diagnosis    Abnormal Papanicolaou smear of cervix    Hx of ectopic pregnancy    Asthma    Encounter for repeat Papanicolaou smear of cervix due to previous unsatisfactory results   . Today patient is here for CPE. RAD: on singulair. PRN albuterol. Breathign has been stable. 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 Denies any dizziness or presyncopal episodes. Has been noted to have moderately elevated blood pressures at several doctors visits. She has since been checking her blood pressures at home. These remain relatively well-controlled while at home. On repeat today is much better. Health maintenance hx includes:  Exercise: moderately active. Form of exercise: regular physical activity. Diet: generally follows a low fat low cholesterol diet. Social: In [address]. 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 Screening:    Colon cancer screening:N/A   Breast cancer screening: last mammogram 2023 and   was normal   Cervical cancer screening: last PAP/Pelvic exam: 2023   and was normal.   Osteoporosis screening:N/A         Immunizations:     Immunization History   Administered Date(s) Administered    COVID-19, PFIZER PURPLE top, DILUTE for use, (age 15 y+), 30mcg/0.3mL 04/18/2021, 05/09/2021    Hepatitis B vaccine 11/25/2011, 12/27/2011, 05/30/2012    TDaP, ADACEL (age 6y-58y), BOOSTRIX (age 10y+), IM, 0.5mL 08/07/2014, 01/18/2016      Immunization status: up to date and documented. ROS  Review of Systems   Constitutional:  Negative for fatigue and fever. HENT:  Negative for congestion and ear pain. Respiratory:  Negative for chest tightness and shortness of breath. Breathing stable. Cardiovascular:  Negative for chest pain and leg swelling.    Gastrointestinal:

## 2023-08-11 DIAGNOSIS — Z00.00 WELLNESS EXAMINATION: ICD-10-CM

## 2023-08-11 DIAGNOSIS — E78.5 HYPERLIPIDEMIA, UNSPECIFIED HYPERLIPIDEMIA TYPE: ICD-10-CM

## 2023-08-11 LAB
ALBUMIN SERPL-MCNC: 3.2 G/DL (ref 3.5–5)
ALBUMIN/GLOB SERPL: 0.9 (ref 1.1–2.2)
ALP SERPL-CCNC: 58 U/L (ref 45–117)
ALT SERPL-CCNC: 21 U/L (ref 12–78)
ANION GAP SERPL CALC-SCNC: 6 MMOL/L (ref 5–15)
AST SERPL-CCNC: 13 U/L (ref 15–37)
BASOPHILS # BLD: 0.1 K/UL (ref 0–0.1)
BASOPHILS NFR BLD: 1 % (ref 0–1)
BILIRUB SERPL-MCNC: 0.5 MG/DL (ref 0.2–1)
BUN SERPL-MCNC: 11 MG/DL (ref 6–20)
BUN/CREAT SERPL: 16 (ref 12–20)
CALCIUM SERPL-MCNC: 8.9 MG/DL (ref 8.5–10.1)
CHLORIDE SERPL-SCNC: 106 MMOL/L (ref 97–108)
CHOLEST SERPL-MCNC: 178 MG/DL
CO2 SERPL-SCNC: 27 MMOL/L (ref 21–32)
CREAT SERPL-MCNC: 0.67 MG/DL (ref 0.55–1.02)
DIFFERENTIAL METHOD BLD: ABNORMAL
EOSINOPHIL # BLD: 0.3 K/UL (ref 0–0.4)
EOSINOPHIL NFR BLD: 4 % (ref 0–7)
ERYTHROCYTE [DISTWIDTH] IN BLOOD BY AUTOMATED COUNT: 11.8 % (ref 11.5–14.5)
GLOBULIN SER CALC-MCNC: 3.6 G/DL (ref 2–4)
GLUCOSE SERPL-MCNC: 82 MG/DL (ref 65–100)
HCT VFR BLD AUTO: 39.3 % (ref 35–47)
HDLC SERPL-MCNC: 48 MG/DL
HDLC SERPL: 3.7 (ref 0–5)
HGB BLD-MCNC: 12.8 G/DL (ref 11.5–16)
IMM GRANULOCYTES # BLD AUTO: 0 K/UL (ref 0–0.04)
IMM GRANULOCYTES NFR BLD AUTO: 0 % (ref 0–0.5)
LDLC SERPL CALC-MCNC: 109.2 MG/DL (ref 0–100)
LYMPHOCYTES # BLD: 3.3 K/UL (ref 0.8–3.5)
LYMPHOCYTES NFR BLD: 50 % (ref 12–49)
MCH RBC QN AUTO: 29.6 PG (ref 26–34)
MCHC RBC AUTO-ENTMCNC: 32.6 G/DL (ref 30–36.5)
MCV RBC AUTO: 90.8 FL (ref 80–99)
MONOCYTES # BLD: 0.4 K/UL (ref 0–1)
MONOCYTES NFR BLD: 6 % (ref 5–13)
NEUTS SEG # BLD: 2.6 K/UL (ref 1.8–8)
NEUTS SEG NFR BLD: 39 % (ref 32–75)
NRBC # BLD: 0 K/UL (ref 0–0.01)
NRBC BLD-RTO: 0 PER 100 WBC
PLATELET # BLD AUTO: 345 K/UL (ref 150–400)
PMV BLD AUTO: 9.7 FL (ref 8.9–12.9)
POTASSIUM SERPL-SCNC: 4.4 MMOL/L (ref 3.5–5.1)
PROT SERPL-MCNC: 6.8 G/DL (ref 6.4–8.2)
RBC # BLD AUTO: 4.33 M/UL (ref 3.8–5.2)
SODIUM SERPL-SCNC: 139 MMOL/L (ref 136–145)
TRIGL SERPL-MCNC: 104 MG/DL
VLDLC SERPL CALC-MCNC: 20.8 MG/DL
WBC # BLD AUTO: 6.7 K/UL (ref 3.6–11)

## 2023-08-11 RX ORDER — AZELASTINE HYDROCHLORIDE 137 UG/1
SPRAY, METERED NASAL
Qty: 3 EACH | Refills: 1 | Status: SHIPPED | OUTPATIENT
Start: 2023-08-11

## 2023-08-14 RX ORDER — NORETHINDRONE ACETATE AND ETHINYL ESTRADIOL 1; .02 MG/1; MG/1
TABLET ORAL
Qty: 3 PACKET | Refills: 3 | Status: SHIPPED | OUTPATIENT
Start: 2023-08-14

## 2023-08-14 NOTE — TELEPHONE ENCOUNTER
From: Albino Whelan  To: Dr. Kelly Brush: 8/10/2023 6:13 PM EDT  Subject: Blood pressure     Good evening. I attended the scheduled physical exam today with my primary doctor. He stated that he's not concerned with the high reading back in July. My blood pressure today was within range. I've also been monitoring it at home and it has not been that high since that day. I hope that you're doing well!

## 2023-10-31 ENCOUNTER — TRANSCRIBE ORDERS (OUTPATIENT)
Facility: HOSPITAL | Age: 43
End: 2023-10-31

## 2023-10-31 DIAGNOSIS — J34.2 DEVIATED NASAL SEPTUM: ICD-10-CM

## 2023-10-31 DIAGNOSIS — J01.41 ACUTE RECURRENT PANSINUSITIS: Primary | ICD-10-CM

## 2023-11-13 ENCOUNTER — HOSPITAL ENCOUNTER (OUTPATIENT)
Facility: HOSPITAL | Age: 43
Discharge: HOME | End: 2023-11-16
Attending: OTOLARYNGOLOGY
Payer: COMMERCIAL

## 2023-11-13 DIAGNOSIS — J01.41 ACUTE RECURRENT PANSINUSITIS: ICD-10-CM

## 2023-11-13 DIAGNOSIS — J34.2 DEVIATED NASAL SEPTUM: ICD-10-CM

## 2023-11-13 PROCEDURE — 70486 CT MAXILLOFACIAL W/O DYE: CPT

## 2024-06-10 ENCOUNTER — TRANSCRIBE ORDERS (OUTPATIENT)
Facility: HOSPITAL | Age: 44
End: 2024-06-10

## 2024-06-10 DIAGNOSIS — Z12.31 SCREENING MAMMOGRAM FOR HIGH-RISK PATIENT: Primary | ICD-10-CM

## 2024-07-01 RX ORDER — AZELASTINE HYDROCHLORIDE 137 UG/1
SPRAY, METERED NASAL
Refills: 1 | OUTPATIENT
Start: 2024-07-01

## 2024-07-10 NOTE — PROGRESS NOTES
Casie Olmos is a 43 y.o. female returns for an annual exam     Chief Complaint   Patient presents with    Annual Exam       Patient's last menstrual period was 06/15/2024.  Her periods are light in flow and usually regular with a 26-32 day interval with 3-7 day duration.  She does not have dysmenorrhea.  Problems: problems - had an abnormal period in June  Birth Control: vasectomy and OCP (estrogen/progesterone).  Last Pap: abnormal obtained 7/10/23  She does have a history of abnormal pap smears    2012 colpo with biopsy, expectant management last pap 2016 normal  Pap/HPV (2/2020) N/N  Pap/HPV (7/2023) ASCUS/HPV neg -> cotest 3yrs    Last Mammogram:  is having done on 7/12/24 .

## 2024-07-11 ENCOUNTER — OFFICE VISIT (OUTPATIENT)
Age: 44
End: 2024-07-11
Payer: COMMERCIAL

## 2024-07-11 VITALS
HEART RATE: 79 BPM | SYSTOLIC BLOOD PRESSURE: 129 MMHG | WEIGHT: 175 LBS | BODY MASS INDEX: 29.12 KG/M2 | DIASTOLIC BLOOD PRESSURE: 87 MMHG

## 2024-07-11 DIAGNOSIS — Z01.419 ENCOUNTER FOR WELL WOMAN EXAM WITH ROUTINE GYNECOLOGICAL EXAM: Primary | ICD-10-CM

## 2024-07-11 DIAGNOSIS — Z12.4 SCREENING FOR CERVICAL CANCER: ICD-10-CM

## 2024-07-11 PROCEDURE — 99396 PREV VISIT EST AGE 40-64: CPT | Performed by: OBSTETRICS & GYNECOLOGY

## 2024-07-11 RX ORDER — NORETHINDRONE ACETATE AND ETHINYL ESTRADIOL .02; 1 MG/1; MG/1
TABLET ORAL
Qty: 3 PACKET | Refills: 4 | Status: SHIPPED | OUTPATIENT
Start: 2024-07-11

## 2024-07-11 NOTE — PROGRESS NOTES
Per Nursing Note:  Casie Olmos is a 43 y.o. female returns for an annual exam          Chief Complaint   Patient presents with    Annual Exam         Patient's last menstrual period was 06/15/2024.  Her periods are light in flow and usually regular with a 26-32 day interval with 3-7 day duration.  She does not have dysmenorrhea.  Problems: problems - had an abnormal period in June  Birth Control: vasectomy and OCP (estrogen/progesterone).  Last Pap: abnormal obtained 7/10/23  She does have a history of abnormal pap smears     2012 colpo with biopsy, expectant management last pap 2016 normal  Pap/HPV (2/2020) N/N  Pap/HPV (7/2023) ASCUS/HPV neg -> cotest 3yrs     Last Mammogram:  is having done on 7/12/24 .          Annual exam      Chief Complaint   Patient presents with    Annual Exam       Casie Olmos is a No obstetric history on file.,  43 y.o. female   Patient's last menstrual period was 06/15/2024.  She presents for her annual checkup.   LV=7/10/23 AE      Had irregular cycle last month    Menstrual status:    Periods usually regular every 4wks, but had some irregular bleeding last month.  Had a lot going on -- stepdaughter started college, traveled out of the country.  Denies missing any pills.    Contraception:    The current method of family planning is OCPs.   had vasectomy, has not been cleared yet.      Sexual history:    She  reports being sexually active and has had partner(s) who are male. She reports using the following methods of birth control/protection: Pill and Male Sterilization.      Pap Smear:    2012 colpo with biopsy, expectant management last pap 2016 normal  Pap/HPV (2/2020) N/N  Pap/HPV (7/2023) ASCUS/HPV neg -> cotest 3yrs            Past Medical History:   Diagnosis Date    Abnormal Pap smear of cervix 07/10/2023    ASCUS/HPV neg -> cotest 3yrs    Abnormal Papanicolaou smear of cervix 2012    colpo with biopsy, expectant management last pap 2016 normal    Asthma     Bronchitis

## 2024-07-12 ENCOUNTER — HOSPITAL ENCOUNTER (OUTPATIENT)
Facility: HOSPITAL | Age: 44
Discharge: HOME | End: 2024-07-12
Attending: OBSTETRICS & GYNECOLOGY
Payer: COMMERCIAL

## 2024-07-12 VITALS — BODY MASS INDEX: 29.16 KG/M2 | HEIGHT: 65 IN | WEIGHT: 175 LBS

## 2024-07-12 DIAGNOSIS — Z12.31 SCREENING MAMMOGRAM FOR HIGH-RISK PATIENT: ICD-10-CM

## 2024-07-12 PROCEDURE — 77063 BREAST TOMOSYNTHESIS BI: CPT

## 2024-09-13 ENCOUNTER — OFFICE VISIT (OUTPATIENT)
Age: 44
End: 2024-09-13
Payer: COMMERCIAL

## 2024-09-13 VITALS
OXYGEN SATURATION: 100 % | TEMPERATURE: 98.2 F | WEIGHT: 173 LBS | HEIGHT: 65 IN | HEART RATE: 65 BPM | SYSTOLIC BLOOD PRESSURE: 115 MMHG | BODY MASS INDEX: 28.82 KG/M2 | RESPIRATION RATE: 16 BRPM | DIASTOLIC BLOOD PRESSURE: 80 MMHG

## 2024-09-13 DIAGNOSIS — E78.5 HYPERLIPIDEMIA, UNSPECIFIED HYPERLIPIDEMIA TYPE: ICD-10-CM

## 2024-09-13 DIAGNOSIS — Z00.00 WELLNESS EXAMINATION: Primary | ICD-10-CM

## 2024-09-13 PROCEDURE — 99396 PREV VISIT EST AGE 40-64: CPT | Performed by: INTERNAL MEDICINE

## 2024-09-13 RX ORDER — MAGNESIUM 30 MG
30 TABLET ORAL DAILY
COMMUNITY

## 2024-09-13 SDOH — ECONOMIC STABILITY: INCOME INSECURITY: HOW HARD IS IT FOR YOU TO PAY FOR THE VERY BASICS LIKE FOOD, HOUSING, MEDICAL CARE, AND HEATING?: NOT HARD AT ALL

## 2024-09-13 SDOH — ECONOMIC STABILITY: FOOD INSECURITY: WITHIN THE PAST 12 MONTHS, YOU WORRIED THAT YOUR FOOD WOULD RUN OUT BEFORE YOU GOT MONEY TO BUY MORE.: NEVER TRUE

## 2024-09-13 SDOH — ECONOMIC STABILITY: FOOD INSECURITY: WITHIN THE PAST 12 MONTHS, THE FOOD YOU BOUGHT JUST DIDN'T LAST AND YOU DIDN'T HAVE MONEY TO GET MORE.: NEVER TRUE

## 2024-09-13 ASSESSMENT — PATIENT HEALTH QUESTIONNAIRE - PHQ9
SUM OF ALL RESPONSES TO PHQ QUESTIONS 1-9: 0
SUM OF ALL RESPONSES TO PHQ QUESTIONS 1-9: 0
2. FEELING DOWN, DEPRESSED OR HOPELESS: NOT AT ALL
SUM OF ALL RESPONSES TO PHQ9 QUESTIONS 1 & 2: 0
1. LITTLE INTEREST OR PLEASURE IN DOING THINGS: NOT AT ALL
SUM OF ALL RESPONSES TO PHQ QUESTIONS 1-9: 0
SUM OF ALL RESPONSES TO PHQ QUESTIONS 1-9: 0

## 2024-09-13 ASSESSMENT — ENCOUNTER SYMPTOMS
BACK PAIN: 0
CONSTIPATION: 0
SHORTNESS OF BREATH: 0
DIARRHEA: 0
ABDOMINAL PAIN: 0
CHEST TIGHTNESS: 0

## 2024-09-14 LAB
ALBUMIN SERPL-MCNC: 4.2 G/DL (ref 3.9–4.9)
ALP SERPL-CCNC: 89 IU/L (ref 44–121)
ALT SERPL-CCNC: 11 IU/L (ref 0–32)
AST SERPL-CCNC: 15 IU/L (ref 0–40)
BASOPHILS # BLD AUTO: 0.1 X10E3/UL (ref 0–0.2)
BASOPHILS NFR BLD AUTO: 1 %
BILIRUB SERPL-MCNC: 0.5 MG/DL (ref 0–1.2)
BUN SERPL-MCNC: 12 MG/DL (ref 6–24)
BUN/CREAT SERPL: 16 (ref 9–23)
CALCIUM SERPL-MCNC: 9.3 MG/DL (ref 8.7–10.2)
CHLORIDE SERPL-SCNC: 102 MMOL/L (ref 96–106)
CHOLEST SERPL-MCNC: 188 MG/DL (ref 100–199)
CO2 SERPL-SCNC: 23 MMOL/L (ref 20–29)
CREAT SERPL-MCNC: 0.75 MG/DL (ref 0.57–1)
EGFRCR SERPLBLD CKD-EPI 2021: 101 ML/MIN/1.73
EOSINOPHIL # BLD AUTO: 0.2 X10E3/UL (ref 0–0.4)
EOSINOPHIL NFR BLD AUTO: 3 %
ERYTHROCYTE [DISTWIDTH] IN BLOOD BY AUTOMATED COUNT: 11.8 % (ref 11.7–15.4)
GLOBULIN SER CALC-MCNC: 2.8 G/DL (ref 1.5–4.5)
GLUCOSE SERPL-MCNC: 96 MG/DL (ref 70–99)
HCT VFR BLD AUTO: 41.2 % (ref 34–46.6)
HDLC SERPL-MCNC: 49 MG/DL
HGB BLD-MCNC: 13.4 G/DL (ref 11.1–15.9)
IMM GRANULOCYTES # BLD AUTO: 0 X10E3/UL (ref 0–0.1)
IMM GRANULOCYTES NFR BLD AUTO: 0 %
IMP & REVIEW OF LAB RESULTS: NORMAL
LDLC SERPL CALC-MCNC: 121 MG/DL (ref 0–99)
LYMPHOCYTES # BLD AUTO: 2.6 X10E3/UL (ref 0.7–3.1)
LYMPHOCYTES NFR BLD AUTO: 34 %
MCH RBC QN AUTO: 29.7 PG (ref 26.6–33)
MCHC RBC AUTO-ENTMCNC: 32.5 G/DL (ref 31.5–35.7)
MCV RBC AUTO: 91 FL (ref 79–97)
MONOCYTES # BLD AUTO: 0.4 X10E3/UL (ref 0.1–0.9)
MONOCYTES NFR BLD AUTO: 5 %
NEUTROPHILS # BLD AUTO: 4.3 X10E3/UL (ref 1.4–7)
NEUTROPHILS NFR BLD AUTO: 57 %
PLATELET # BLD AUTO: 337 X10E3/UL (ref 150–450)
POTASSIUM SERPL-SCNC: 4.9 MMOL/L (ref 3.5–5.2)
PROT SERPL-MCNC: 7 G/DL (ref 6–8.5)
RBC # BLD AUTO: 4.51 X10E6/UL (ref 3.77–5.28)
SODIUM SERPL-SCNC: 137 MMOL/L (ref 134–144)
TRIGL SERPL-MCNC: 97 MG/DL (ref 0–149)
VLDLC SERPL CALC-MCNC: 18 MG/DL (ref 5–40)
WBC # BLD AUTO: 7.5 X10E3/UL (ref 3.4–10.8)

## 2024-12-16 RX ORDER — MONTELUKAST SODIUM 10 MG/1
10 TABLET ORAL DAILY
Qty: 90 TABLET | Refills: 3 | Status: SHIPPED | OUTPATIENT
Start: 2024-12-16

## 2025-03-10 RX ORDER — AZELASTINE HYDROCHLORIDE 137 UG/1
SPRAY, METERED NASAL
Qty: 3 EACH | Refills: 1 | Status: SHIPPED | OUTPATIENT
Start: 2025-03-10

## 2025-07-14 NOTE — PROGRESS NOTES
Casie Olmos is a 44 y.o. female returns for an annual exam     Chief Complaint   Patient presents with    Annual Exam       No LMP recorded.  Her periods are light in flow and usually regular with a 26-32 day interval with 3-7 day duration.  She has dysmenorrhea. With BC pill, more cramps  Problems: no problems  Birth Control: vasectomy.  Last Pap: abnormal obtained 2 year(s) ago.  She does not have a history of LILLI 2, 3 or cervical cancer.   Last Mammogram: had her mammogram today in our office.  It was see report.       1. Have you been to the ER, urgent care clinic, or hospitalized since your last visit? No    2. Have you seen or consulted any other health care providers outside of the [facility] since your last visit? No    Examination chaperoned by Becca Salazar MA.

## 2025-07-15 ENCOUNTER — OFFICE VISIT (OUTPATIENT)
Age: 45
End: 2025-07-15
Payer: COMMERCIAL

## 2025-07-15 VITALS
WEIGHT: 183 LBS | BODY MASS INDEX: 30.49 KG/M2 | SYSTOLIC BLOOD PRESSURE: 120 MMHG | DIASTOLIC BLOOD PRESSURE: 81 MMHG | HEART RATE: 75 BPM | HEIGHT: 65 IN

## 2025-07-15 DIAGNOSIS — Z12.4 CERVICAL CANCER SCREENING: ICD-10-CM

## 2025-07-15 DIAGNOSIS — Z01.419 ENCOUNTER FOR WELL WOMAN EXAM: Primary | ICD-10-CM

## 2025-07-15 DIAGNOSIS — Z11.51 SCREENING FOR HUMAN PAPILLOMAVIRUS: ICD-10-CM

## 2025-07-15 PROCEDURE — 99396 PREV VISIT EST AGE 40-64: CPT | Performed by: STUDENT IN AN ORGANIZED HEALTH CARE EDUCATION/TRAINING PROGRAM

## 2025-07-15 PROCEDURE — 99459 PELVIC EXAMINATION: CPT | Performed by: STUDENT IN AN ORGANIZED HEALTH CARE EDUCATION/TRAINING PROGRAM

## 2025-07-15 SDOH — ECONOMIC STABILITY: FOOD INSECURITY: WITHIN THE PAST 12 MONTHS, YOU WORRIED THAT YOUR FOOD WOULD RUN OUT BEFORE YOU GOT MONEY TO BUY MORE.: NEVER TRUE

## 2025-07-15 SDOH — ECONOMIC STABILITY: FOOD INSECURITY: WITHIN THE PAST 12 MONTHS, THE FOOD YOU BOUGHT JUST DIDN'T LAST AND YOU DIDN'T HAVE MONEY TO GET MORE.: NEVER TRUE

## 2025-07-15 ASSESSMENT — PATIENT HEALTH QUESTIONNAIRE - PHQ9
1. LITTLE INTEREST OR PLEASURE IN DOING THINGS: NOT AT ALL
SUM OF ALL RESPONSES TO PHQ QUESTIONS 1-9: 0
2. FEELING DOWN, DEPRESSED OR HOPELESS: NOT AT ALL

## 2025-07-15 NOTE — PROGRESS NOTES
Annual exam ages 40-64      Casie Olmos is a No obstetric history on file.,  44 y.o. female   Patient's last menstrual period was 06/20/2025 (exact date).    She presents for her annual checkup.     She is having no problems.      Menstrual status:    Her periods are light in flow. She is using three to five pads or tampons per day, usually regular and last 26-30 days.    She does not have dysmenorrhea.    She reports no premenstrual symptoms.    Contraception:    The current method of family planning is vasectomy.    Hormonal status:  She reports no perimenstrual type symptoms.   She is not having vasomotor symptoms.  The patient is not using any ERT.    Sexual history:    She  reports being sexually active and has had partner(s) who are male. She reports using the following methods of birth control/protection: Pill and Male Sterilization.    Medical conditions:    Since her last annual GYN exam about 2 years ago, she has not the following changes in her health history: none.     Surgical history confirmed with patient.  has a past surgical history that includes gyn and sinus surgery (01/2024).    Pap and Mammogram History:    Her most recent Pap smear was abnormal (ASCUS/HPV negative), obtained 2 year(s) ago.    The patient had her mammogram today in our office.    Breast Cancer History/Substance Abuse: negative      Osteoporosis History:    Family history does not include a first or second degree relative with osteopenia or osteoporosis.    Past Medical History:   Diagnosis Date    Abnormal Pap smear of cervix 07/10/2023    ASCUS/HPV neg -> cotest 3yrs    Abnormal Papanicolaou smear of cervix 2012    colpo with biopsy, expectant management last pap 2016 normal    Asthma     Bronchitis     Ectopic pregnancy 2007    Routine Papanicolaou smear 02/07/2020    Negative ; HVP Negative     Screening mammogram for breast cancer 07/12/2024    BIRADS 1: Negative. No mammographic evidence of malignancy     Past Surgical

## 2025-07-18 LAB
CYTOLOGIST CVX/VAG CYTO: NORMAL
CYTOLOGY CVX/VAG DOC CYTO: NORMAL
CYTOLOGY CVX/VAG DOC THIN PREP: NORMAL
DX ICD CODE: NORMAL
HPV GENOTYPE REFLEX: NORMAL
HPV I/H RISK 4 DNA CVX QL PROBE+SIG AMP: NEGATIVE
OTHER STN SPEC: NORMAL
SERVICE CMNT-IMP: NORMAL
STAT OF ADQ CVX/VAG CYTO-IMP: NORMAL